# Patient Record
Sex: FEMALE | Race: WHITE | NOT HISPANIC OR LATINO | Employment: OTHER | URBAN - METROPOLITAN AREA
[De-identification: names, ages, dates, MRNs, and addresses within clinical notes are randomized per-mention and may not be internally consistent; named-entity substitution may affect disease eponyms.]

---

## 2017-01-09 ENCOUNTER — OFFICE VISIT (OUTPATIENT)
Dept: DERMATOLOGY | Age: 72
End: 2017-01-09

## 2017-01-09 DIAGNOSIS — D23.9 INTRADERMAL NEVUS: Primary | ICD-10-CM

## 2017-01-09 PROCEDURE — 99212 OFFICE O/P EST SF 10 MIN: CPT | Performed by: DERMATOLOGY

## 2017-03-21 ENCOUNTER — LAB SERVICES (OUTPATIENT)
Dept: LAB | Age: 72
End: 2017-03-21

## 2017-03-21 DIAGNOSIS — E89.0 POSTOPERATIVE HYPOTHYROIDISM: ICD-10-CM

## 2017-03-21 DIAGNOSIS — E55.9 VITAMIN D DEFICIENCY: ICD-10-CM

## 2017-03-21 DIAGNOSIS — R73.01 IFG (IMPAIRED FASTING GLUCOSE): ICD-10-CM

## 2017-03-21 PROCEDURE — 36415 COLL VENOUS BLD VENIPUNCTURE: CPT | Performed by: FAMILY MEDICINE

## 2017-03-22 LAB
25(OH)D3+25(OH)D2 SERPL-MCNC: 37.8 NG/ML (ref 30–100)
ANION GAP SERPL CALC-SCNC: 20 MMOL/L (ref 10–20)
BUN SERPL-MCNC: 18 MG/DL (ref 6–20)
BUN/CREAT SERPL: 25 (ref 7–25)
CALCIUM SERPL-MCNC: 9.2 MG/DL (ref 8.4–10.2)
CHLORIDE SERPL-SCNC: 104 MMOL/L (ref 98–107)
CO2 SERPL-SCNC: 23 MMOL/L (ref 21–32)
CREAT SERPL-MCNC: 0.71 MG/DL (ref 0.51–0.95)
FASTING STATUS PATIENT QL REPORTED: 12 HRS
GLUCOSE SERPL-MCNC: 130 MG/DL (ref 65–99)
HBA1C MFR BLD: 5.6 % (ref 4.5–5.6)
POTASSIUM SERPL-SCNC: 4.1 MMOL/L (ref 3.4–5.1)
SODIUM SERPL-SCNC: 143 MMOL/L (ref 135–145)
TSH SERPL-ACNC: 0.14 MCUNITS/ML (ref 0.35–5)

## 2017-03-28 ENCOUNTER — OFFICE VISIT (OUTPATIENT)
Dept: FAMILY MEDICINE | Age: 72
End: 2017-03-28

## 2017-03-28 VITALS
HEART RATE: 84 BPM | WEIGHT: 165 LBS | SYSTOLIC BLOOD PRESSURE: 130 MMHG | BODY MASS INDEX: 29.23 KG/M2 | DIASTOLIC BLOOD PRESSURE: 84 MMHG | HEIGHT: 63 IN | RESPIRATION RATE: 12 BRPM

## 2017-03-28 DIAGNOSIS — K21.9 GASTROESOPHAGEAL REFLUX DISEASE, ESOPHAGITIS PRESENCE NOT SPECIFIED: ICD-10-CM

## 2017-03-28 DIAGNOSIS — M81.0 OSTEOPOROSIS: ICD-10-CM

## 2017-03-28 DIAGNOSIS — E89.0 POSTOPERATIVE HYPOTHYROIDISM: ICD-10-CM

## 2017-03-28 DIAGNOSIS — E78.5 DYSLIPIDEMIA: ICD-10-CM

## 2017-03-28 DIAGNOSIS — Z00.00 MEDICARE ANNUAL WELLNESS VISIT, SUBSEQUENT: Primary | ICD-10-CM

## 2017-03-28 DIAGNOSIS — D68.51 FACTOR V LEIDEN (CMD): ICD-10-CM

## 2017-03-28 DIAGNOSIS — R73.01 IFG (IMPAIRED FASTING GLUCOSE): ICD-10-CM

## 2017-03-28 DIAGNOSIS — Z12.31 ENCOUNTER FOR SCREENING MAMMOGRAM FOR BREAST CANCER: ICD-10-CM

## 2017-03-28 PROCEDURE — G0439 PPPS, SUBSEQ VISIT: HCPCS | Performed by: FAMILY MEDICINE

## 2017-03-28 RX ORDER — OMEPRAZOLE 20 MG/1
20 CAPSULE, DELAYED RELEASE ORAL DAILY
Qty: 90 CAPSULE | Refills: 3 | Status: SHIPPED | OUTPATIENT
Start: 2017-03-28 | End: 2018-08-30 | Stop reason: SDUPTHER

## 2017-03-28 RX ORDER — LEVOTHYROXINE SODIUM 0.1 MG/1
100 TABLET ORAL DAILY
Qty: 90 TABLET | Refills: 3 | Status: SHIPPED | OUTPATIENT
Start: 2017-03-28 | End: 2018-03-12 | Stop reason: SDUPTHER

## 2017-03-28 ASSESSMENT — PATIENT HEALTH QUESTIONNAIRE - PHQ9
1. LITTLE INTEREST OR PLEASURE IN DOING THINGS: NO
2. FEELING DOWN, DEPRESSED OR HOPELESS: NO

## 2017-04-04 ENCOUNTER — IMAGING SERVICES (OUTPATIENT)
Dept: MAMMOGRAPHY | Age: 72
End: 2017-04-04
Attending: FAMILY MEDICINE

## 2017-04-04 DIAGNOSIS — Z12.31 ENCOUNTER FOR SCREENING MAMMOGRAM FOR BREAST CANCER: ICD-10-CM

## 2017-04-04 PROCEDURE — G0202 SCR MAMMO BI INCL CAD: HCPCS | Performed by: RADIOLOGY

## 2017-04-04 PROCEDURE — 77063 BREAST TOMOSYNTHESIS BI: CPT | Performed by: RADIOLOGY

## 2017-04-04 PROCEDURE — 3342F MAMMO ASSESS BENGN DOCD: CPT | Performed by: RADIOLOGY

## 2017-06-09 ENCOUNTER — TELEPHONE (OUTPATIENT)
Dept: FAMILY MEDICINE | Age: 72
End: 2017-06-09

## 2017-06-09 DIAGNOSIS — Z01.84 IMMUNITY STATUS TESTING: Primary | ICD-10-CM

## 2018-01-31 ENCOUNTER — APPOINTMENT (OUTPATIENT)
Age: 73
Setting detail: DERMATOLOGY
End: 2018-01-31

## 2018-01-31 DIAGNOSIS — L82.0 INFLAMED SEBORRHEIC KERATOSIS: ICD-10-CM

## 2018-01-31 DIAGNOSIS — L23.9 ALLERGIC CONTACT DERMATITIS, UNSPECIFIED CAUSE: ICD-10-CM

## 2018-01-31 DIAGNOSIS — Z85.828 PERSONAL HISTORY OF OTHER MALIGNANT NEOPLASM OF SKIN: ICD-10-CM

## 2018-01-31 DIAGNOSIS — D18.0 HEMANGIOMA: ICD-10-CM

## 2018-01-31 PROBLEM — D48.5 NEOPLASM OF UNCERTAIN BEHAVIOR OF SKIN: Status: ACTIVE | Noted: 2018-01-31

## 2018-01-31 PROBLEM — L30.9 DERMATITIS, UNSPECIFIED: Status: ACTIVE | Noted: 2018-01-31

## 2018-01-31 PROBLEM — D18.01 HEMANGIOMA OF SKIN AND SUBCUTANEOUS TISSUE: Status: ACTIVE | Noted: 2018-01-31

## 2018-01-31 PROCEDURE — OTHER REASSURANCE: OTHER

## 2018-01-31 PROCEDURE — OTHER BIOPSY BY SHAVE METHOD: OTHER

## 2018-01-31 PROCEDURE — OTHER COUNSELING: OTHER

## 2018-01-31 PROCEDURE — 11100: CPT

## 2018-01-31 PROCEDURE — 99214 OFFICE O/P EST MOD 30 MIN: CPT | Mod: 25

## 2018-01-31 PROCEDURE — OTHER PRESCRIPTION: OTHER

## 2018-01-31 PROCEDURE — 11101: CPT

## 2018-01-31 RX ORDER — TRIAMCINOLONE ACETONIDE 1 MG/G
CREAM TOPICAL BID PRN
Qty: 1 | Refills: 2 | Status: ERX | COMMUNITY
Start: 2018-01-31

## 2018-01-31 RX ORDER — HYDROXYZINE HYDROCHLORIDE 25 MG/1
TABLET, FILM COATED ORAL
Qty: 30 | Refills: 2 | Status: ERX

## 2018-01-31 ASSESSMENT — LOCATION ZONE DERM
LOCATION ZONE: ARM
LOCATION ZONE: FACE

## 2018-01-31 ASSESSMENT — LOCATION SIMPLE DESCRIPTION DERM
LOCATION SIMPLE: LEFT FOREARM
LOCATION SIMPLE: GLABELLA
LOCATION SIMPLE: LEFT POSTERIOR UPPER ARM
LOCATION SIMPLE: LEFT SHOULDER

## 2018-01-31 ASSESSMENT — LOCATION DETAILED DESCRIPTION DERM
LOCATION DETAILED: LEFT DISTAL RADIAL DORSAL FOREARM
LOCATION DETAILED: LEFT POSTERIOR SHOULDER
LOCATION DETAILED: GLABELLA
LOCATION DETAILED: LEFT PROXIMAL POSTERIOR UPPER ARM
LOCATION DETAILED: LEFT PROXIMAL DORSAL FOREARM

## 2018-01-31 NOTE — PROCEDURE: BIOPSY BY SHAVE METHOD
Anesthesia Volume In Cc (Will Not Render If 0): 0.5
Billing Type: Third-Party Bill
Notification Instructions: Patient will be notified of biopsy results. However, patient instructed to call the office if not contacted within 2 weeks.
Bill 62908 For Specimen Handling/Conveyance To Laboratory?: no
Post-Care Instructions: I reviewed with the patient in detail post-care instructions. Patient is to keep the biopsy site dry overnight, and then apply bacitracin twice daily until healed.
Cryotherapy Text: The wound bed was treated with cryotherapy after the biopsy was performed.
Biopsy Method: Dermablade
Curettage Text: The wound bed was treated with curettage after the biopsy was performed.
Additional Anesthesia Volume In Cc (Will Not Render If 0): 0
Electrodesiccation Text: The wound bed was treated with electrodesiccation after the biopsy was performed.
Biopsy Type: H and E
Dressing: bandage
Hemostasis: Aluminum Chloride
Type Of Destruction Used: Curettage
Anesthesia Type: 1% lidocaine with epinephrine and a 1:10 solution of 8.4% sodium bicarbonate
Silver Nitrate Text: The wound bed was treated with silver nitrate after the biopsy was performed.
Wound Care: Bacitracin
Electrodesiccation And Curettage Text: The wound bed was treated with electrodesiccation and curettage after the biopsy was performed.
Consent: Written consent was obtained and risks were reviewed including but not limited to scarring, infection, bleeding, scabbing, incomplete removal, nerve damage and allergy to anesthesia.
Detail Level: Detailed

## 2018-02-12 ENCOUNTER — APPOINTMENT (OUTPATIENT)
Age: 73
Setting detail: DERMATOLOGY
End: 2018-02-12

## 2018-02-12 DIAGNOSIS — Z48.02 ENCOUNTER FOR REMOVAL OF SUTURES: ICD-10-CM

## 2018-02-12 PROCEDURE — OTHER SUTURE REMOVAL (GLOBAL PERIOD): OTHER

## 2018-02-12 PROCEDURE — OTHER ADDITIONAL NOTES: OTHER

## 2018-02-12 PROCEDURE — 99024 POSTOP FOLLOW-UP VISIT: CPT

## 2018-02-12 ASSESSMENT — LOCATION ZONE DERM
LOCATION ZONE: FACE
LOCATION ZONE: ARM

## 2018-02-12 ASSESSMENT — LOCATION DETAILED DESCRIPTION DERM
LOCATION DETAILED: LEFT POSTERIOR SHOULDER
LOCATION DETAILED: LEFT DISTAL RADIAL DORSAL FOREARM
LOCATION DETAILED: LEFT PROXIMAL POSTERIOR UPPER ARM
LOCATION DETAILED: GLABELLA
LOCATION DETAILED: LEFT PROXIMAL DORSAL FOREARM

## 2018-02-12 ASSESSMENT — LOCATION SIMPLE DESCRIPTION DERM
LOCATION SIMPLE: GLABELLA
LOCATION SIMPLE: LEFT SHOULDER
LOCATION SIMPLE: LEFT FOREARM
LOCATION SIMPLE: LEFT POSTERIOR UPPER ARM

## 2018-02-12 NOTE — PROCEDURE: SUTURE REMOVAL (GLOBAL PERIOD)
Add 11969 Cpt? (Important Note: In 2017 The Use Of 57005 Is Being Tracked By Cms To Determine Future Global Period Reimbursement For Global Periods): yes
Detail Level: Detailed

## 2018-02-20 ENCOUNTER — APPOINTMENT (OUTPATIENT)
Age: 73
Setting detail: DERMATOLOGY
End: 2018-02-20

## 2018-02-20 DIAGNOSIS — Z48.02 ENCOUNTER FOR REMOVAL OF SUTURES: ICD-10-CM

## 2018-02-20 PROCEDURE — OTHER LIQUID NITROGEN: OTHER

## 2018-02-20 PROCEDURE — OTHER PATHOLOGY DISCUSSION: OTHER

## 2018-02-20 PROCEDURE — 99024 POSTOP FOLLOW-UP VISIT: CPT

## 2018-02-20 PROCEDURE — 17263 DSTRJ MAL LES T/A/L 2.1-3.0: CPT | Mod: 59

## 2018-02-20 PROCEDURE — 17282 DSTR MAL LS F/E/E/N/L/M1.1-2: CPT | Mod: 59

## 2018-02-20 PROCEDURE — 17000 DESTRUCT PREMALG LESION: CPT

## 2018-02-20 PROCEDURE — OTHER COUNSELING: OTHER

## 2018-02-20 PROCEDURE — OTHER SUTURE REMOVAL (GLOBAL PERIOD): OTHER

## 2018-02-20 PROCEDURE — OTHER CRYOSURGICAL DESTRUCTION: OTHER

## 2018-02-20 ASSESSMENT — LOCATION ZONE DERM
LOCATION ZONE: ARM
LOCATION ZONE: FACE

## 2018-02-20 ASSESSMENT — LOCATION DETAILED DESCRIPTION DERM
LOCATION DETAILED: LEFT PROXIMAL DORSAL FOREARM
LOCATION DETAILED: LEFT DISTAL RADIAL DORSAL FOREARM
LOCATION DETAILED: LEFT PROXIMAL POSTERIOR UPPER ARM
LOCATION DETAILED: LEFT DISTAL DORSAL FOREARM
LOCATION DETAILED: LEFT POSTERIOR SHOULDER
LOCATION DETAILED: GLABELLA

## 2018-02-20 ASSESSMENT — LOCATION SIMPLE DESCRIPTION DERM
LOCATION SIMPLE: GLABELLA
LOCATION SIMPLE: LEFT FOREARM
LOCATION SIMPLE: LEFT SHOULDER
LOCATION SIMPLE: LEFT POSTERIOR UPPER ARM

## 2018-02-20 NOTE — PROCEDURE: CRYOSURGICAL DESTRUCTION
Total Time In Minutes: 3 minutes
Add Ability To Document Additional Intralesional Injection: No
Detail Level: Detailed
Post-Care Instructions: I reviewed with the patient in detail post-care instructions. Patient is to keep the area dry for 48 hours, and not to engage in any heavy lifting, exercise, or swimming for the next 14 days. Should the patient develop any fevers, chills, bleeding, severe pain patient will contact the office immediately.
Total Volume (Ccs): 1
Medication Injected: 5-Fluorouracil
Size Of Lesion In Cm: 2.6
Size Of Lesion In Cm: 1.3
Pre-Procedure: The surgical site was antiseptically prepared.
Additional Information: (Optional): The wound was cleaned, and a dressing was applied.  The patient received detailed post-op instructions.
Number Of Freeze-Thaw Cycles: 3 freeze-thaw cycles
Anesthesia Volume In Cc: 0
Bill As A Line Item Or As Units: Line Item
Anesthesia Type: 1% lidocaine with epinephrine
Consent was obtained from the patient. The risks and benefits to therapy were discussed in detail. Specifically, the risks of infection, scarring, bleeding, prolonged wound healing, incomplete removal, allergy to anesthesia, nerve injury and recurrence were addressed. Alternatives to liquid nitrogen, such as ED&C, surgical removal, XRT were also discussed.  Prior to the procedure, the treatment site was clearly identified and confirmed by the patient. All components of Universal Protocol/PAUSE Rule completed.

## 2018-02-20 NOTE — PROCEDURE: SUTURE REMOVAL (GLOBAL PERIOD)
Detail Level: Detailed
Add 06157 Cpt? (Important Note: In 2017 The Use Of 39476 Is Being Tracked By Cms To Determine Future Global Period Reimbursement For Global Periods): yes

## 2018-02-21 ENCOUNTER — APPOINTMENT (OUTPATIENT)
Age: 73
Setting detail: DERMATOLOGY
End: 2018-02-21

## 2018-03-12 ENCOUNTER — TELEPHONE (OUTPATIENT)
Dept: FAMILY MEDICINE | Age: 73
End: 2018-03-12

## 2018-03-12 DIAGNOSIS — E89.0 POSTOPERATIVE HYPOTHYROIDISM: ICD-10-CM

## 2018-03-12 RX ORDER — LEVOTHYROXINE SODIUM 0.1 MG/1
100 TABLET ORAL DAILY
Qty: 90 TABLET | Refills: 0 | Status: SHIPPED | OUTPATIENT
Start: 2018-03-12 | End: 2018-04-11 | Stop reason: SDUPTHER

## 2018-03-19 ENCOUNTER — APPOINTMENT (OUTPATIENT)
Age: 73
Setting detail: DERMATOLOGY
End: 2018-03-19

## 2018-03-19 ENCOUNTER — RX ONLY (RX ONLY)
Age: 73
End: 2018-03-19

## 2018-03-19 DIAGNOSIS — T81.89 OTHER COMPLICATIONS OF PROCEDURES, NOT ELSEWHERE CLASSIFIED: ICD-10-CM

## 2018-03-19 PROBLEM — T81.89XA OTHER COMPLICATIONS OF PROCEDURES, NOT ELSEWHERE CLASSIFIED, INITIAL ENCOUNTER: Status: ACTIVE | Noted: 2018-03-19

## 2018-03-19 PROCEDURE — OTHER PRESCRIPTION: OTHER

## 2018-03-19 PROCEDURE — OTHER COUNSELING: OTHER

## 2018-03-19 PROCEDURE — 99024 POSTOP FOLLOW-UP VISIT: CPT

## 2018-03-19 PROCEDURE — OTHER MIPS QUALITY: OTHER

## 2018-03-19 RX ORDER — MUPIROCIN 20 MG/G
OINTMENT TOPICAL BID
Qty: 1 | Refills: 1 | Status: ERX | COMMUNITY
Start: 2018-03-19

## 2018-03-19 RX ORDER — CEPHALEXIN 500 MG/1
1 CAPSULE ORAL BID
Qty: 14 | Refills: 0 | Status: ERX

## 2018-03-19 ASSESSMENT — LOCATION ZONE DERM: LOCATION ZONE: ARM

## 2018-03-19 ASSESSMENT — LOCATION SIMPLE DESCRIPTION DERM: LOCATION SIMPLE: LEFT POSTERIOR UPPER ARM

## 2018-03-19 ASSESSMENT — LOCATION DETAILED DESCRIPTION DERM: LOCATION DETAILED: LEFT PROXIMAL POSTERIOR UPPER ARM

## 2018-03-20 ENCOUNTER — TELEPHONE (OUTPATIENT)
Dept: FAMILY MEDICINE | Age: 73
End: 2018-03-20

## 2018-03-20 RX ORDER — AZITHROMYCIN 250 MG/1
TABLET, FILM COATED ORAL
Qty: 6 TABLET | Refills: 0 | Status: SHIPPED | OUTPATIENT
Start: 2018-03-20 | End: 2018-03-25

## 2018-03-27 ENCOUNTER — APPOINTMENT (OUTPATIENT)
Age: 73
Setting detail: DERMATOLOGY
End: 2018-03-27

## 2018-03-27 DIAGNOSIS — Z85.828 PERSONAL HISTORY OF OTHER MALIGNANT NEOPLASM OF SKIN: ICD-10-CM

## 2018-03-27 DIAGNOSIS — T81.89 OTHER COMPLICATIONS OF PROCEDURES, NOT ELSEWHERE CLASSIFIED: ICD-10-CM

## 2018-03-27 PROBLEM — T81.89XA OTHER COMPLICATIONS OF PROCEDURES, NOT ELSEWHERE CLASSIFIED, INITIAL ENCOUNTER: Status: ACTIVE | Noted: 2018-03-27

## 2018-03-27 PROCEDURE — OTHER REASSURANCE: OTHER

## 2018-03-27 PROCEDURE — OTHER COUNSELING: OTHER

## 2018-03-27 PROCEDURE — 99024 POSTOP FOLLOW-UP VISIT: CPT

## 2018-03-27 PROCEDURE — OTHER ADDITIONAL NOTES: OTHER

## 2018-04-04 ENCOUNTER — LAB SERVICES (OUTPATIENT)
Dept: LAB | Age: 73
End: 2018-04-04

## 2018-04-04 DIAGNOSIS — K21.9 GASTROESOPHAGEAL REFLUX DISEASE, ESOPHAGITIS PRESENCE NOT SPECIFIED: ICD-10-CM

## 2018-04-04 DIAGNOSIS — R73.01 IFG (IMPAIRED FASTING GLUCOSE): ICD-10-CM

## 2018-04-04 DIAGNOSIS — M81.0 OSTEOPOROSIS: ICD-10-CM

## 2018-04-04 DIAGNOSIS — E78.5 DYSLIPIDEMIA: ICD-10-CM

## 2018-04-04 DIAGNOSIS — E89.0 POSTOPERATIVE HYPOTHYROIDISM: ICD-10-CM

## 2018-04-04 PROCEDURE — 36415 COLL VENOUS BLD VENIPUNCTURE: CPT | Performed by: FAMILY MEDICINE

## 2018-04-05 LAB
25(OH)D3+25(OH)D2 SERPL-MCNC: 31.2 NG/ML (ref 30–100)
ALBUMIN SERPL-MCNC: 4 G/DL (ref 3.6–5.1)
ALBUMIN/GLOB SERPL: 1.4 {RATIO} (ref 1–2.4)
ALP SERPL-CCNC: 98 UNITS/L (ref 45–117)
ALT SERPL-CCNC: 24 UNITS/L
ANION GAP SERPL CALC-SCNC: 12 MMOL/L (ref 10–20)
AST SERPL-CCNC: 17 UNITS/L
BASOPHILS # BLD AUTO: 0 K/MCL (ref 0–0.3)
BASOPHILS NFR BLD AUTO: 1 %
BILIRUB SERPL-MCNC: 0.6 MG/DL (ref 0.2–1)
BUN SERPL-MCNC: 16 MG/DL (ref 6–20)
BUN/CREAT SERPL: 23 (ref 7–25)
CALCIUM SERPL-MCNC: 8.9 MG/DL (ref 8.4–10.2)
CHLORIDE SERPL-SCNC: 110 MMOL/L (ref 98–107)
CHOLEST SERPL-MCNC: 185 MG/DL
CHOLEST/HDLC SERPL: 3.3 {RATIO}
CO2 SERPL-SCNC: 27 MMOL/L (ref 21–32)
CREAT SERPL-MCNC: 0.69 MG/DL (ref 0.51–0.95)
DIFFERENTIAL METHOD BLD: ABNORMAL
EOSINOPHIL # BLD AUTO: 0 K/MCL (ref 0.1–0.5)
EOSINOPHIL NFR SPEC: 1 %
ERYTHROCYTE [DISTWIDTH] IN BLOOD: 13.1 % (ref 11–15)
FASTING STATUS PATIENT QL REPORTED: 12 HRS
GLOBULIN SER-MCNC: 2.8 G/DL (ref 2–4)
GLUCOSE SERPL-MCNC: 122 MG/DL (ref 65–99)
HBA1C MFR BLD: 5.1 % (ref 4.5–5.6)
HCT VFR BLD CALC: 46.3 % (ref 36–46.5)
HDLC SERPL-MCNC: 56 MG/DL
HGB BLD-MCNC: 15.2 G/DL (ref 12–15.5)
IMM GRANULOCYTES # BLD AUTO: 0 K/MCL (ref 0–0.2)
IMM GRANULOCYTES NFR BLD: 0 %
LDLC SERPL-MCNC: 96 MG/DL
LENGTH OF FAST TIME PATIENT: 12 HRS
LYMPHOCYTES # BLD MANUAL: 1.5 K/MCL (ref 1–4)
LYMPHOCYTES NFR BLD MANUAL: 32 %
MCH RBC QN AUTO: 31.1 PG (ref 26–34)
MCHC RBC AUTO-ENTMCNC: 32.8 G/DL (ref 32–36.5)
MCV RBC AUTO: 94.7 FL (ref 78–100)
MONOCYTES # BLD MANUAL: 0.3 K/MCL (ref 0.3–0.9)
MONOCYTES NFR BLD MANUAL: 6 %
NEUTROPHILS # BLD: 2.8 K/MCL (ref 1.8–7.7)
NEUTROPHILS NFR BLD AUTO: 60 %
NONHDLC SERPL-MCNC: 129 MG/DL
NRBC BLD MANUAL-RTO: 0 %
PLATELET # BLD: 214 K/MCL (ref 140–450)
POTASSIUM SERPL-SCNC: 4.2 MMOL/L (ref 3.4–5.1)
PROT SERPL-MCNC: 6.8 G/DL (ref 6.4–8.2)
RBC # BLD: 4.89 MIL/MCL (ref 4–5.2)
SODIUM SERPL-SCNC: 145 MMOL/L (ref 135–145)
TRIGL SERPL-MCNC: 166 MG/DL
TSH SERPL-ACNC: 0.05 MCUNITS/ML (ref 0.35–5)
WBC # BLD: 4.7 K/MCL (ref 4.2–11)

## 2018-04-11 ENCOUNTER — OFFICE VISIT (OUTPATIENT)
Dept: FAMILY MEDICINE | Age: 73
End: 2018-04-11

## 2018-04-11 ENCOUNTER — APPOINTMENT (OUTPATIENT)
Age: 73
Setting detail: DERMATOLOGY
End: 2018-04-11

## 2018-04-11 VITALS
BODY MASS INDEX: 28.17 KG/M2 | HEART RATE: 80 BPM | SYSTOLIC BLOOD PRESSURE: 136 MMHG | DIASTOLIC BLOOD PRESSURE: 88 MMHG | WEIGHT: 159 LBS | RESPIRATION RATE: 12 BRPM | HEIGHT: 63 IN

## 2018-04-11 DIAGNOSIS — D68.51 FACTOR V LEIDEN (CMD): ICD-10-CM

## 2018-04-11 DIAGNOSIS — Z00.00 MEDICARE ANNUAL WELLNESS VISIT, SUBSEQUENT: Primary | ICD-10-CM

## 2018-04-11 DIAGNOSIS — M81.0 OSTEOPOROSIS, UNSPECIFIED OSTEOPOROSIS TYPE, UNSPECIFIED PATHOLOGICAL FRACTURE PRESENCE: ICD-10-CM

## 2018-04-11 DIAGNOSIS — R73.01 IFG (IMPAIRED FASTING GLUCOSE): ICD-10-CM

## 2018-04-11 DIAGNOSIS — T81.89 OTHER COMPLICATIONS OF PROCEDURES, NOT ELSEWHERE CLASSIFIED: ICD-10-CM

## 2018-04-11 DIAGNOSIS — E78.5 DYSLIPIDEMIA: ICD-10-CM

## 2018-04-11 DIAGNOSIS — E89.0 POSTOPERATIVE HYPOTHYROIDISM: ICD-10-CM

## 2018-04-11 DIAGNOSIS — Z00.00 PREVENTATIVE HEALTH CARE: ICD-10-CM

## 2018-04-11 DIAGNOSIS — R25.1 TREMOR: ICD-10-CM

## 2018-04-11 DIAGNOSIS — E55.9 VITAMIN D DEFICIENCY: ICD-10-CM

## 2018-04-11 DIAGNOSIS — Z86.73 HISTORY OF CVA (CEREBROVASCULAR ACCIDENT): ICD-10-CM

## 2018-04-11 DIAGNOSIS — K21.9 GASTROESOPHAGEAL REFLUX DISEASE, ESOPHAGITIS PRESENCE NOT SPECIFIED: ICD-10-CM

## 2018-04-11 PROBLEM — T81.89XA OTHER COMPLICATIONS OF PROCEDURES, NOT ELSEWHERE CLASSIFIED, INITIAL ENCOUNTER: Status: ACTIVE | Noted: 2018-04-11

## 2018-04-11 PROCEDURE — OTHER ADDITIONAL NOTES: OTHER

## 2018-04-11 PROCEDURE — G0439 PPPS, SUBSEQ VISIT: HCPCS | Performed by: FAMILY MEDICINE

## 2018-04-11 PROCEDURE — 99212 OFFICE O/P EST SF 10 MIN: CPT

## 2018-04-11 PROCEDURE — OTHER COUNSELING: OTHER

## 2018-04-11 PROCEDURE — 99214 OFFICE O/P EST MOD 30 MIN: CPT | Performed by: FAMILY MEDICINE

## 2018-04-11 RX ORDER — NAPROXEN SODIUM 220 MG
440 TABLET ORAL DAILY
COMMUNITY
End: 2019-04-12 | Stop reason: ALTCHOICE

## 2018-04-11 RX ORDER — LEVOTHYROXINE SODIUM 88 UG/1
88 TABLET ORAL DAILY
Qty: 90 TABLET | Refills: 3 | Status: SHIPPED | OUTPATIENT
Start: 2018-04-11 | End: 2018-08-30 | Stop reason: SDUPTHER

## 2018-04-11 ASSESSMENT — PATIENT HEALTH QUESTIONNAIRE - PHQ9
CLINICAL INTERPRETATION OF PHQ2 SCORE: 0
SUM OF ALL RESPONSES TO PHQ9 QUESTIONS 1 AND 2: 0

## 2018-04-11 ASSESSMENT — LOCATION ZONE DERM: LOCATION ZONE: ARM

## 2018-04-11 ASSESSMENT — LOCATION SIMPLE DESCRIPTION DERM: LOCATION SIMPLE: LEFT POSTERIOR UPPER ARM

## 2018-04-11 ASSESSMENT — LOCATION DETAILED DESCRIPTION DERM: LOCATION DETAILED: LEFT PROXIMAL POSTERIOR UPPER ARM

## 2018-04-11 NOTE — PROCEDURE: ADDITIONAL NOTES
Additional Notes: Lesion shows much improvement from last visit.  Lesion is healing well.  Redness is noted around the lesion, but no signs of infection noted.  Area will be cleansed and dressed.

## 2018-06-27 ENCOUNTER — APPOINTMENT (OUTPATIENT)
Age: 73
Setting detail: DERMATOLOGY
End: 2018-06-27

## 2018-06-27 DIAGNOSIS — L57.0 ACTINIC KERATOSIS: ICD-10-CM

## 2018-06-27 DIAGNOSIS — Z85.828 PERSONAL HISTORY OF OTHER MALIGNANT NEOPLASM OF SKIN: ICD-10-CM

## 2018-06-27 PROBLEM — D48.5 NEOPLASM OF UNCERTAIN BEHAVIOR OF SKIN: Status: ACTIVE | Noted: 2018-06-27

## 2018-06-27 PROCEDURE — 99213 OFFICE O/P EST LOW 20 MIN: CPT | Mod: 25

## 2018-06-27 PROCEDURE — OTHER REASSURANCE: OTHER

## 2018-06-27 PROCEDURE — OTHER BIOPSY BY SHAVE METHOD: OTHER

## 2018-06-27 PROCEDURE — 11100: CPT

## 2018-06-27 PROCEDURE — 11101: CPT

## 2018-06-27 ASSESSMENT — LOCATION SIMPLE DESCRIPTION DERM
LOCATION SIMPLE: LEFT FOREARM
LOCATION SIMPLE: RIGHT FOREARM

## 2018-06-27 ASSESSMENT — LOCATION DETAILED DESCRIPTION DERM
LOCATION DETAILED: RIGHT DISTAL DORSAL FOREARM
LOCATION DETAILED: LEFT DISTAL DORSAL FOREARM
LOCATION DETAILED: RIGHT PROXIMAL DORSAL FOREARM

## 2018-06-27 ASSESSMENT — LOCATION ZONE DERM: LOCATION ZONE: ARM

## 2018-06-27 NOTE — PROCEDURE: BIOPSY BY SHAVE METHOD
Silver Nitrate Text: The wound bed was treated with silver nitrate after the biopsy was performed.
Wound Care: Bacitracin
Post-Care Instructions: I reviewed with the patient in detail post-care instructions. Patient is to keep the biopsy site dry overnight, and then apply bacitracin twice daily until healed.
Notification Instructions: Patient will be notified of biopsy results. However, patient instructed to call the office if not contacted within 2 weeks.
Was A Bandage Applied: Yes
X Size Of Lesion In Cm: 0
Bill For Surgical Tray: no
Consent: Written consent was obtained and risks were reviewed including but not limited to scarring, infection, bleeding, scabbing, incomplete removal, nerve damage and allergy to anesthesia.
Biopsy Type: H and E
Electrodesiccation And Curettage Text: The wound bed was treated with electrodesiccation and curettage after the biopsy was performed.
Billing Type: Third-Party Bill
Anesthesia Type: 1% lidocaine with epinephrine and a 1:10 solution of 8.4% sodium bicarbonate
Biopsy Method: Dermablade
Dressing: bandage
Electrodesiccation Text: The wound bed was treated with electrodesiccation after the biopsy was performed.
Detail Level: Detailed
Hemostasis: Aluminum Chloride
Type Of Destruction Used: Curettage
Cryotherapy Text: The wound bed was treated with cryotherapy after the biopsy was performed.
Anesthesia Volume In Cc (Will Not Render If 0): 0.5
Depth Of Biopsy: dermis
Curettage Text: The wound bed was treated with curettage after the biopsy was performed.

## 2018-07-02 ENCOUNTER — LAB SERVICES (OUTPATIENT)
Dept: LAB | Age: 73
End: 2018-07-02

## 2018-07-02 ENCOUNTER — TELEPHONE (OUTPATIENT)
Dept: FAMILY MEDICINE | Age: 73
End: 2018-07-02

## 2018-07-02 DIAGNOSIS — R39.15 URINARY URGENCY: ICD-10-CM

## 2018-07-02 DIAGNOSIS — R30.0 DYSURIA: ICD-10-CM

## 2018-07-02 DIAGNOSIS — R30.0 DYSURIA: Primary | ICD-10-CM

## 2018-07-02 DIAGNOSIS — R35.0 FREQUENCY OF URINATION: ICD-10-CM

## 2018-07-02 LAB
APPEARANCE UR: NORMAL
BILIRUB UR QL: NORMAL
COLOR UR: YELLOW
GLUCOSE UR-MCNC: NORMAL MG/DL
HGB UR QL: NORMAL
KETONES UR-MCNC: NORMAL MG/DL
LEUKOCYTE ESTERASE UR QL STRIP: NORMAL
NITRITE UR QL: NORMAL
PH UR: 5.5 [PH]
PROT UR QL: NORMAL
SP GR UR: <1.005
SPECIMEN SOURCE: NORMAL
UROBILINOGEN UR QL: 0.2

## 2018-07-02 PROCEDURE — 81002 URINALYSIS NONAUTO W/O SCOPE: CPT | Performed by: FAMILY MEDICINE

## 2018-07-02 RX ORDER — CIPROFLOXACIN 500 MG/1
500 TABLET, FILM COATED ORAL EVERY 12 HOURS
Qty: 14 TABLET | Refills: 0 | Status: SHIPPED | OUTPATIENT
Start: 2018-07-02 | End: 2018-07-09

## 2018-07-03 LAB
APPEARANCE UR: ABNORMAL
BACTERIA #/AREA URNS HPF: ABNORMAL /HPF
BILIRUB UR QL STRIP: NEGATIVE
COLOR UR: YELLOW
GLUCOSE UR STRIP-MCNC: NEGATIVE MG/DL
HGB UR QL STRIP: ABNORMAL
HYALINE CASTS #/AREA URNS LPF: ABNORMAL /LPF (ref 0–5)
KETONES UR STRIP-MCNC: NEGATIVE MG/DL
LEUKOCYTE ESTERASE UR QL STRIP: ABNORMAL
NITRITE UR QL STRIP: NEGATIVE
PH UR STRIP: 6 UNITS (ref 5–7)
PROT UR STRIP-MCNC: NEGATIVE MG/DL
RBC #/AREA URNS HPF: ABNORMAL /HPF (ref 0–3)
SP GR UR STRIP: 1.01 (ref 1–1.03)
SPECIMEN SOURCE: ABNORMAL
SQUAMOUS #/AREA URNS HPF: ABNORMAL /HPF (ref 0–5)
UROBILINOGEN UR STRIP-MCNC: 0.2 MG/DL (ref 0–1)
WBC #/AREA URNS HPF: >100 /HPF (ref 0–5)

## 2018-07-04 LAB
BACTERIA UR CULT: ABNORMAL
ORGANISM: ABNORMAL
REPORT STATUS (RPT): ABNORMAL
SPECIMEN SOURCE: ABNORMAL

## 2018-07-06 ENCOUNTER — RX ONLY (RX ONLY)
Age: 73
End: 2018-07-06

## 2018-07-06 ENCOUNTER — APPOINTMENT (OUTPATIENT)
Age: 73
Setting detail: DERMATOLOGY
End: 2018-07-06

## 2018-07-06 DIAGNOSIS — T81.89 OTHER COMPLICATIONS OF PROCEDURES, NOT ELSEWHERE CLASSIFIED: ICD-10-CM

## 2018-07-06 PROBLEM — T81.89XA OTHER COMPLICATIONS OF PROCEDURES, NOT ELSEWHERE CLASSIFIED, INITIAL ENCOUNTER: Status: ACTIVE | Noted: 2018-07-06

## 2018-07-06 PROCEDURE — OTHER PRESCRIPTION: OTHER

## 2018-07-06 PROCEDURE — 99024 POSTOP FOLLOW-UP VISIT: CPT

## 2018-07-06 PROCEDURE — OTHER COUNSELING: OTHER

## 2018-07-06 RX ORDER — CEPHALEXIN 500 MG/1
CAPSULE ORAL BID
Qty: 28 | Refills: 0 | Status: ERX

## 2018-07-06 RX ORDER — MUPIROCIN 20 MG/G
OINTMENT TOPICAL BID
Qty: 1 | Refills: 1 | Status: ERX

## 2018-07-12 ENCOUNTER — APPOINTMENT (OUTPATIENT)
Age: 73
Setting detail: DERMATOLOGY
End: 2018-07-12

## 2018-07-12 DIAGNOSIS — T81.89 OTHER COMPLICATIONS OF PROCEDURES, NOT ELSEWHERE CLASSIFIED: ICD-10-CM

## 2018-07-12 PROBLEM — T81.89XA OTHER COMPLICATIONS OF PROCEDURES, NOT ELSEWHERE CLASSIFIED, INITIAL ENCOUNTER: Status: ACTIVE | Noted: 2018-07-12

## 2018-07-12 PROCEDURE — OTHER ADDITIONAL NOTES: OTHER

## 2018-07-12 PROCEDURE — OTHER COUNSELING: OTHER

## 2018-07-12 PROCEDURE — 99024 POSTOP FOLLOW-UP VISIT: CPT

## 2018-07-12 NOTE — PROCEDURE: ADDITIONAL NOTES
Additional Notes: Pts wound appears to be healing well and is continuing to get better with antibiotics.

## 2018-07-26 ENCOUNTER — APPOINTMENT (OUTPATIENT)
Age: 73
Setting detail: DERMATOLOGY
End: 2018-07-26

## 2018-07-26 DIAGNOSIS — L57.0 ACTINIC KERATOSIS: ICD-10-CM

## 2018-07-26 DIAGNOSIS — T81.89 OTHER COMPLICATIONS OF PROCEDURES, NOT ELSEWHERE CLASSIFIED: ICD-10-CM

## 2018-07-26 PROBLEM — T81.89XA OTHER COMPLICATIONS OF PROCEDURES, NOT ELSEWHERE CLASSIFIED, INITIAL ENCOUNTER: Status: ACTIVE | Noted: 2018-07-26

## 2018-07-26 PROCEDURE — OTHER COUNSELING: OTHER

## 2018-07-26 PROCEDURE — OTHER LIQUID NITROGEN: OTHER

## 2018-07-26 PROCEDURE — OTHER ADDITIONAL NOTES: OTHER

## 2018-07-26 PROCEDURE — 99212 OFFICE O/P EST SF 10 MIN: CPT | Mod: 25

## 2018-07-26 PROCEDURE — 17000 DESTRUCT PREMALG LESION: CPT

## 2018-07-26 ASSESSMENT — LOCATION ZONE DERM: LOCATION ZONE: ARM

## 2018-07-26 ASSESSMENT — LOCATION DETAILED DESCRIPTION DERM
LOCATION DETAILED: RIGHT PROXIMAL DORSAL FOREARM
LOCATION DETAILED: RIGHT DISTAL DORSAL FOREARM

## 2018-07-26 ASSESSMENT — LOCATION SIMPLE DESCRIPTION DERM: LOCATION SIMPLE: RIGHT FOREARM

## 2018-07-26 NOTE — PROCEDURE: ADDITIONAL NOTES
Additional Notes: Pts wound appears to be healing well and is continuing to get better with antibiotics.\\nWill plan on Cryotherapy at f/u visit.

## 2018-08-16 ENCOUNTER — APPOINTMENT (OUTPATIENT)
Age: 73
Setting detail: DERMATOLOGY
End: 2018-08-16

## 2018-08-16 DIAGNOSIS — L57.0 ACTINIC KERATOSIS: ICD-10-CM

## 2018-08-16 DIAGNOSIS — L82.1 OTHER SEBORRHEIC KERATOSIS: ICD-10-CM

## 2018-08-16 PROCEDURE — 17000 DESTRUCT PREMALG LESION: CPT

## 2018-08-16 PROCEDURE — OTHER COUNSELING: OTHER

## 2018-08-16 PROCEDURE — 99213 OFFICE O/P EST LOW 20 MIN: CPT | Mod: 25

## 2018-08-16 PROCEDURE — OTHER REASSURANCE: OTHER

## 2018-08-16 PROCEDURE — OTHER LIQUID NITROGEN: OTHER

## 2018-08-16 ASSESSMENT — LOCATION DETAILED DESCRIPTION DERM: LOCATION DETAILED: RIGHT DISTAL DORSAL FOREARM

## 2018-08-16 ASSESSMENT — LOCATION ZONE DERM: LOCATION ZONE: ARM

## 2018-08-16 ASSESSMENT — LOCATION SIMPLE DESCRIPTION DERM: LOCATION SIMPLE: RIGHT FOREARM

## 2018-08-30 ENCOUNTER — TELEPHONE (OUTPATIENT)
Dept: FAMILY MEDICINE | Age: 73
End: 2018-08-30

## 2018-08-30 DIAGNOSIS — K21.9 GASTROESOPHAGEAL REFLUX DISEASE, ESOPHAGITIS PRESENCE NOT SPECIFIED: ICD-10-CM

## 2018-08-30 DIAGNOSIS — E89.0 POSTOPERATIVE HYPOTHYROIDISM: ICD-10-CM

## 2018-08-30 RX ORDER — OMEPRAZOLE 20 MG/1
20 CAPSULE, DELAYED RELEASE ORAL DAILY
Qty: 90 CAPSULE | Refills: 2 | Status: SHIPPED | OUTPATIENT
Start: 2018-08-30 | End: 2019-04-16 | Stop reason: SDUPTHER

## 2018-08-30 RX ORDER — LEVOTHYROXINE SODIUM 88 UG/1
88 TABLET ORAL DAILY
Qty: 90 TABLET | Refills: 2 | Status: SHIPPED | OUTPATIENT
Start: 2018-08-30 | End: 2019-04-16 | Stop reason: SDUPTHER

## 2018-10-15 ENCOUNTER — LAB SERVICES (OUTPATIENT)
Dept: LAB | Age: 73
End: 2018-10-15

## 2018-10-15 DIAGNOSIS — E89.0 POSTOPERATIVE HYPOTHYROIDISM: ICD-10-CM

## 2018-10-15 PROCEDURE — 36415 COLL VENOUS BLD VENIPUNCTURE: CPT | Performed by: FAMILY MEDICINE

## 2018-10-16 LAB — TSH SERPL-ACNC: 0.26 MCUNITS/ML (ref 0.35–5)

## 2019-01-07 ENCOUNTER — TELEPHONE (OUTPATIENT)
Dept: FAMILY MEDICINE | Age: 74
End: 2019-01-07

## 2019-01-10 ENCOUNTER — TELEPHONE (OUTPATIENT)
Dept: FAMILY MEDICINE | Age: 74
End: 2019-01-10

## 2019-01-10 RX ORDER — NYSTATIN 100000 U/G
CREAM TOPICAL 2 TIMES DAILY
Qty: 60 G | Refills: 0 | Status: SHIPPED | OUTPATIENT
Start: 2019-01-10 | End: 2019-02-10

## 2019-01-10 RX ORDER — FLUTICASONE PROPIONATE 0.05 %
CREAM (GRAM) TOPICAL
Qty: 15 G | Refills: 0 | Status: SHIPPED | OUTPATIENT
Start: 2019-01-10 | End: 2021-03-12 | Stop reason: SDUPTHER

## 2019-04-08 ENCOUNTER — LAB SERVICES (OUTPATIENT)
Dept: LAB | Age: 74
End: 2019-04-08

## 2019-04-08 ENCOUNTER — TELEPHONE (OUTPATIENT)
Dept: FAMILY MEDICINE | Age: 74
End: 2019-04-08

## 2019-04-08 DIAGNOSIS — Z11.59 NEED FOR HEPATITIS C SCREENING TEST: Primary | ICD-10-CM

## 2019-04-08 DIAGNOSIS — R73.01 IFG (IMPAIRED FASTING GLUCOSE): ICD-10-CM

## 2019-04-08 DIAGNOSIS — E89.0 POSTOPERATIVE HYPOTHYROIDISM: ICD-10-CM

## 2019-04-08 DIAGNOSIS — Z11.59 NEED FOR HEPATITIS C SCREENING TEST: ICD-10-CM

## 2019-04-08 DIAGNOSIS — E55.9 VITAMIN D DEFICIENCY: ICD-10-CM

## 2019-04-08 DIAGNOSIS — E78.5 DYSLIPIDEMIA: ICD-10-CM

## 2019-04-08 PROCEDURE — 36415 COLL VENOUS BLD VENIPUNCTURE: CPT | Performed by: FAMILY MEDICINE

## 2019-04-09 LAB
25(OH)D3+25(OH)D2 SERPL-MCNC: 28.1 NG/ML (ref 30–100)
ALBUMIN SERPL-MCNC: 4.1 G/DL (ref 3.6–5.1)
ALBUMIN/GLOB SERPL: 1.5 {RATIO} (ref 1–2.4)
ALP SERPL-CCNC: 101 UNITS/L (ref 45–117)
ALT SERPL-CCNC: 26 UNITS/L
ANION GAP SERPL CALC-SCNC: 11 MMOL/L (ref 10–20)
AST SERPL-CCNC: 14 UNITS/L
BILIRUB SERPL-MCNC: 0.6 MG/DL (ref 0.2–1)
BUN SERPL-MCNC: 27 MG/DL (ref 6–20)
BUN/CREAT SERPL: 35 (ref 7–25)
CALCIUM SERPL-MCNC: 9.2 MG/DL (ref 8.4–10.2)
CHLORIDE SERPL-SCNC: 106 MMOL/L (ref 98–107)
CHOLEST SERPL-MCNC: 181 MG/DL
CHOLEST/HDLC SERPL: 3.4 {RATIO}
CO2 SERPL-SCNC: 28 MMOL/L (ref 21–32)
CREAT SERPL-MCNC: 0.78 MG/DL (ref 0.51–0.95)
FASTING STATUS PATIENT QL REPORTED: 12 HRS
GLOBULIN SER-MCNC: 2.8 G/DL (ref 2–4)
GLUCOSE SERPL-MCNC: 139 MG/DL (ref 65–99)
HBA1C MFR BLD: 5.5 % (ref 4.5–5.6)
HCV AB SER QL: NEGATIVE
HDLC SERPL-MCNC: 53 MG/DL
LDLC SERPL-MCNC: 98 MG/DL
LENGTH OF FAST TIME PATIENT: 12 HRS
NONHDLC SERPL-MCNC: 128 MG/DL
POTASSIUM SERPL-SCNC: 4.6 MMOL/L (ref 3.4–5.1)
PROT SERPL-MCNC: 6.9 G/DL (ref 6.4–8.2)
SODIUM SERPL-SCNC: 140 MMOL/L (ref 135–145)
TRIGL SERPL-MCNC: 149 MG/DL
TSH SERPL-ACNC: 0.56 MCUNITS/ML (ref 0.35–5)

## 2019-04-12 ENCOUNTER — OFFICE VISIT (OUTPATIENT)
Dept: FAMILY MEDICINE | Age: 74
End: 2019-04-12

## 2019-04-12 VITALS
RESPIRATION RATE: 16 BRPM | HEART RATE: 70 BPM | HEIGHT: 63 IN | WEIGHT: 159.2 LBS | DIASTOLIC BLOOD PRESSURE: 86 MMHG | BODY MASS INDEX: 28.21 KG/M2 | SYSTOLIC BLOOD PRESSURE: 161 MMHG

## 2019-04-12 DIAGNOSIS — Z00.00 MEDICARE ANNUAL WELLNESS VISIT, SUBSEQUENT: Primary | ICD-10-CM

## 2019-04-12 DIAGNOSIS — R73.01 IFG (IMPAIRED FASTING GLUCOSE): ICD-10-CM

## 2019-04-12 DIAGNOSIS — R03.0 BLOOD PRESSURE ELEVATED WITHOUT HISTORY OF HTN: ICD-10-CM

## 2019-04-12 DIAGNOSIS — E55.9 VITAMIN D DEFICIENCY: ICD-10-CM

## 2019-04-12 DIAGNOSIS — Z12.11 COLON CANCER SCREENING: ICD-10-CM

## 2019-04-12 DIAGNOSIS — M81.0 OSTEOPOROSIS, UNSPECIFIED OSTEOPOROSIS TYPE, UNSPECIFIED PATHOLOGICAL FRACTURE PRESENCE: ICD-10-CM

## 2019-04-12 DIAGNOSIS — E78.5 DYSLIPIDEMIA: ICD-10-CM

## 2019-04-12 DIAGNOSIS — D68.51 FACTOR V LEIDEN (CMD): ICD-10-CM

## 2019-04-12 DIAGNOSIS — E03.9 HYPOTHYROIDISM, UNSPECIFIED TYPE: ICD-10-CM

## 2019-04-12 PROCEDURE — G0403 EKG FOR INITIAL PREVENT EXAM: HCPCS | Performed by: FAMILY MEDICINE

## 2019-04-12 PROCEDURE — G0439 PPPS, SUBSEQ VISIT: HCPCS | Performed by: FAMILY MEDICINE

## 2019-04-12 PROCEDURE — 99214 OFFICE O/P EST MOD 30 MIN: CPT | Performed by: FAMILY MEDICINE

## 2019-04-12 RX ORDER — LOSARTAN POTASSIUM 25 MG/1
25 TABLET ORAL DAILY
Qty: 30 TABLET | Refills: 2 | Status: SHIPPED | OUTPATIENT
Start: 2019-04-12 | End: 2019-04-12 | Stop reason: SDUPTHER

## 2019-04-12 RX ORDER — MULTIVITAMIN,THER AND MINERALS
1 TABLET ORAL DAILY
COMMUNITY

## 2019-04-12 RX ORDER — LOSARTAN POTASSIUM 25 MG/1
25 TABLET ORAL DAILY
Qty: 30 TABLET | Refills: 2 | Status: SHIPPED | OUTPATIENT
Start: 2019-04-12 | End: 2019-08-27 | Stop reason: SDUPTHER

## 2019-04-12 ASSESSMENT — PATIENT HEALTH QUESTIONNAIRE - PHQ9
1. LITTLE INTEREST OR PLEASURE IN DOING THINGS: NOT AT ALL
SUM OF ALL RESPONSES TO PHQ9 QUESTIONS 1 AND 2: 0
2. FEELING DOWN, DEPRESSED OR HOPELESS: NOT AT ALL
SUM OF ALL RESPONSES TO PHQ9 QUESTIONS 1 AND 2: 0

## 2019-04-16 DIAGNOSIS — E89.0 POSTOPERATIVE HYPOTHYROIDISM: ICD-10-CM

## 2019-04-16 DIAGNOSIS — K21.9 GASTROESOPHAGEAL REFLUX DISEASE, ESOPHAGITIS PRESENCE NOT SPECIFIED: ICD-10-CM

## 2019-04-16 RX ORDER — OMEPRAZOLE 20 MG/1
20 CAPSULE, DELAYED RELEASE ORAL DAILY
Qty: 90 CAPSULE | Refills: 3 | Status: SHIPPED | OUTPATIENT
Start: 2019-04-16 | End: 2020-04-27

## 2019-04-16 RX ORDER — LEVOTHYROXINE SODIUM 88 UG/1
88 TABLET ORAL DAILY
Qty: 90 TABLET | Refills: 3 | Status: SHIPPED | OUTPATIENT
Start: 2019-04-16 | End: 2020-04-27

## 2019-04-26 ENCOUNTER — TELEPHONE (OUTPATIENT)
Dept: FAMILY MEDICINE | Age: 74
End: 2019-04-26

## 2019-04-26 ENCOUNTER — NURSE ONLY (OUTPATIENT)
Dept: FAMILY MEDICINE | Age: 74
End: 2019-04-26

## 2019-04-26 VITALS — SYSTOLIC BLOOD PRESSURE: 142 MMHG | DIASTOLIC BLOOD PRESSURE: 84 MMHG | HEART RATE: 60 BPM

## 2019-04-26 DIAGNOSIS — R03.0 BLOOD PRESSURE ELEVATED WITHOUT HISTORY OF HTN: Primary | ICD-10-CM

## 2019-04-30 ENCOUNTER — TELEPHONE (OUTPATIENT)
Dept: FAMILY MEDICINE | Age: 74
End: 2019-04-30

## 2019-04-30 ENCOUNTER — NURSE ONLY (OUTPATIENT)
Dept: FAMILY MEDICINE | Age: 74
End: 2019-04-30

## 2019-04-30 VITALS — DIASTOLIC BLOOD PRESSURE: 74 MMHG | SYSTOLIC BLOOD PRESSURE: 128 MMHG

## 2019-04-30 DIAGNOSIS — R03.0 BLOOD PRESSURE ELEVATED WITHOUT HISTORY OF HTN: Primary | ICD-10-CM

## 2019-05-02 ENCOUNTER — ADVANCED DIRECTIVES (OUTPATIENT)
Dept: HEALTH INFORMATION MANAGEMENT | Age: 74
End: 2019-05-02

## 2019-05-29 LAB — NONINV COLON CA DNA+OCC BLD SCRN STL QL: POSITIVE

## 2019-06-10 ENCOUNTER — TELEPHONE (OUTPATIENT)
Dept: FAMILY MEDICINE | Age: 74
End: 2019-06-10

## 2019-06-10 DIAGNOSIS — Z12.11 ENCOUNTER FOR COLORECTAL CANCER SCREENING: Primary | ICD-10-CM

## 2019-06-10 DIAGNOSIS — R19.5 POSITIVE COLORECTAL CANCER SCREENING USING COLOGUARD TEST: ICD-10-CM

## 2019-06-10 DIAGNOSIS — Z12.12 ENCOUNTER FOR COLORECTAL CANCER SCREENING: Primary | ICD-10-CM

## 2019-06-11 ENCOUNTER — TELEPHONE (OUTPATIENT)
Dept: SURGERY | Age: 74
End: 2019-06-11

## 2019-06-12 ENCOUNTER — TELEPHONE (OUTPATIENT)
Dept: FAMILY MEDICINE | Age: 74
End: 2019-06-12

## 2019-06-12 DIAGNOSIS — R19.5 POSITIVE COLORECTAL CANCER SCREENING USING DNA-BASED STOOL TEST: Primary | ICD-10-CM

## 2019-07-10 ENCOUNTER — TELEPHONE (OUTPATIENT)
Dept: SURGERY | Age: 74
End: 2019-07-10

## 2019-07-12 ENCOUNTER — NURSE ONLY (OUTPATIENT)
Dept: FAMILY MEDICINE | Age: 74
End: 2019-07-12

## 2019-07-12 VITALS — DIASTOLIC BLOOD PRESSURE: 62 MMHG | SYSTOLIC BLOOD PRESSURE: 138 MMHG | HEART RATE: 88 BPM

## 2019-07-12 DIAGNOSIS — R03.0 BLOOD PRESSURE ELEVATED WITHOUT HISTORY OF HTN: Primary | ICD-10-CM

## 2019-07-16 ENCOUNTER — HOSPITAL ENCOUNTER (OUTPATIENT)
Age: 74
Discharge: HOME | End: 2019-07-16
Attending: SURGERY | Admitting: SURGERY

## 2019-07-16 ENCOUNTER — ANESTHESIA EVENT (OUTPATIENT)
Dept: SURGERY | Age: 74
End: 2019-07-16

## 2019-07-16 ENCOUNTER — ANESTHESIA (OUTPATIENT)
Dept: SURGERY | Age: 74
End: 2019-07-16

## 2019-07-16 VITALS
HEART RATE: 68 BPM | OXYGEN SATURATION: 96 % | WEIGHT: 157.85 LBS | RESPIRATION RATE: 19 BRPM | BODY MASS INDEX: 27.97 KG/M2 | DIASTOLIC BLOOD PRESSURE: 69 MMHG | HEIGHT: 63 IN | TEMPERATURE: 96.6 F | SYSTOLIC BLOOD PRESSURE: 140 MMHG

## 2019-07-16 DIAGNOSIS — Z12.11 COLON CANCER SCREENING: ICD-10-CM

## 2019-07-16 PROCEDURE — 10002807 HB RX 258: Performed by: SURGERY

## 2019-07-16 PROCEDURE — 10003428 HB COLONOSCOPY: Performed by: SURGERY

## 2019-07-16 PROCEDURE — 10001568 HB ANESTH MAC IV ADD'L 1/2 HR: Performed by: SURGERY

## 2019-07-16 PROCEDURE — 10002362 HB ANESTH MAC IV 1ST 1/2 HR: Performed by: SURGERY

## 2019-07-16 PROCEDURE — 88305 TISSUE EXAM BY PATHOLOGIST: CPT

## 2019-07-16 PROCEDURE — 45385 COLONOSCOPY W/LESION REMOVAL: CPT | Performed by: NURSE ANESTHETIST, CERTIFIED REGISTERED

## 2019-07-16 PROCEDURE — 10002801 HB RX 250 W/O HCPCS: Performed by: NURSE ANESTHETIST, CERTIFIED REGISTERED

## 2019-07-16 PROCEDURE — 10002800 HB RX 250 W HCPCS: Performed by: NURSE ANESTHETIST, CERTIFIED REGISTERED

## 2019-07-16 PROCEDURE — 45385 COLONOSCOPY W/LESION REMOVAL: CPT | Performed by: SURGERY

## 2019-07-16 RX ORDER — PROPOFOL 10 MG/ML
INJECTION, EMULSION INTRAVENOUS PRN
Status: DISCONTINUED | OUTPATIENT
Start: 2019-07-16 | End: 2019-07-16

## 2019-07-16 RX ORDER — 0.9 % SODIUM CHLORIDE 0.9 %
2 VIAL (ML) INJECTION PRN
Status: DISCONTINUED | OUTPATIENT
Start: 2019-07-16 | End: 2019-07-16 | Stop reason: HOSPADM

## 2019-07-16 RX ORDER — SODIUM CHLORIDE, SODIUM LACTATE, POTASSIUM CHLORIDE, CALCIUM CHLORIDE 600; 310; 30; 20 MG/100ML; MG/100ML; MG/100ML; MG/100ML
INJECTION, SOLUTION INTRAVENOUS CONTINUOUS
Status: DISCONTINUED | OUTPATIENT
Start: 2019-07-16 | End: 2019-07-16 | Stop reason: HOSPADM

## 2019-07-16 RX ORDER — GLYCOPYRROLATE 0.2 MG/ML
INJECTION, SOLUTION INTRAMUSCULAR; INTRAVENOUS PRN
Status: DISCONTINUED | OUTPATIENT
Start: 2019-07-16 | End: 2019-07-16

## 2019-07-16 RX ORDER — ONDANSETRON 2 MG/ML
4 INJECTION INTRAMUSCULAR; INTRAVENOUS 2 TIMES DAILY PRN
Status: DISCONTINUED | OUTPATIENT
Start: 2019-07-16 | End: 2019-07-16 | Stop reason: HOSPADM

## 2019-07-16 RX ORDER — 0.9 % SODIUM CHLORIDE 0.9 %
2 VIAL (ML) INJECTION EVERY 12 HOURS SCHEDULED
Status: DISCONTINUED | OUTPATIENT
Start: 2019-07-16 | End: 2019-07-16 | Stop reason: HOSPADM

## 2019-07-16 RX ADMIN — PROPOFOL 50 MG: 10 INJECTION, EMULSION INTRAVENOUS at 15:48

## 2019-07-16 RX ADMIN — PROPOFOL 100 MG: 10 INJECTION, EMULSION INTRAVENOUS at 15:18

## 2019-07-16 RX ADMIN — GLYCOPYRROLATE 0.2 MG: 0.2 INJECTION, SOLUTION INTRAMUSCULAR; INTRAVENOUS at 15:35

## 2019-07-16 RX ADMIN — PROPOFOL 50 MG: 10 INJECTION, EMULSION INTRAVENOUS at 15:22

## 2019-07-16 RX ADMIN — PROPOFOL 50 MG: 10 INJECTION, EMULSION INTRAVENOUS at 15:27

## 2019-07-16 RX ADMIN — PROPOFOL 50 MG: 10 INJECTION, EMULSION INTRAVENOUS at 15:24

## 2019-07-16 RX ADMIN — PROPOFOL 50 MG: 10 INJECTION, EMULSION INTRAVENOUS at 15:37

## 2019-07-16 RX ADMIN — SODIUM CHLORIDE, POTASSIUM CHLORIDE, SODIUM LACTATE AND CALCIUM CHLORIDE: 600; 310; 30; 20 INJECTION, SOLUTION INTRAVENOUS at 14:08

## 2019-07-16 RX ADMIN — PROPOFOL 50 MG: 10 INJECTION, EMULSION INTRAVENOUS at 15:42

## 2019-07-16 RX ADMIN — PROPOFOL 50 MG: 10 INJECTION, EMULSION INTRAVENOUS at 15:31

## 2019-07-16 ASSESSMENT — COGNITIVE AND FUNCTIONAL STATUS - GENERAL
ARE YOU BLIND OR DO YOU HAVE SERIOUS DIFFICULTY SEEING, EVEN WHEN WEARING GLASSES: NO
ARE YOU DEAF OR DO YOU HAVE SERIOUS DIFFICULTY  HEARING: NO

## 2019-07-16 ASSESSMENT — ACTIVITIES OF DAILY LIVING (ADL)
ADL_SHORT_OF_BREATH: NO
ADL_SCORE: 12
CHRONIC_PAIN_PRESENT: NO
HISTORY OF FALLING IN THE LAST YEAR (PRIOR TO ADMISSION): NO
RECENT_DECLINE_ADL: NO
ADL_BEFORE_ADMISSION: INDEPENDENT
NEEDS_ASSIST: NO

## 2019-07-16 ASSESSMENT — LIFESTYLE VARIABLES
SMOKING_STATUS: FORMER
SMOKING_HISTORY: NO

## 2019-07-16 ASSESSMENT — PAIN SCALES - GENERAL
PAIN_LEVEL_WITH_ACTIVITY: 0
PAIN_LEVEL_AT_REST: 0
PAIN_LEVEL_AT_REST: 0

## 2019-07-18 ENCOUNTER — TELEPHONE (OUTPATIENT)
Dept: SURGERY | Age: 74
End: 2019-07-18

## 2019-07-18 LAB — PATHOLOGIST NAME: NORMAL

## 2019-08-27 ENCOUNTER — TELEPHONE (OUTPATIENT)
Dept: FAMILY MEDICINE | Age: 74
End: 2019-08-27

## 2019-08-27 RX ORDER — LOSARTAN POTASSIUM 25 MG/1
25 TABLET ORAL DAILY
Qty: 90 TABLET | Refills: 2 | Status: SHIPPED | OUTPATIENT
Start: 2019-08-27 | End: 2020-04-27

## 2019-09-30 ENCOUNTER — RX ONLY (RX ONLY)
Age: 74
End: 2019-09-30

## 2019-09-30 RX ORDER — LIDOCAINE 4 G/100G
4% CREAM TOPICAL
Qty: 1 | Refills: 2 | Status: ERX | COMMUNITY
Start: 2019-09-30

## 2019-10-28 ENCOUNTER — APPOINTMENT (OUTPATIENT)
Age: 74
Setting detail: DERMATOLOGY
End: 2019-10-28

## 2019-10-28 DIAGNOSIS — Z85.828 PERSONAL HISTORY OF OTHER MALIGNANT NEOPLASM OF SKIN: ICD-10-CM

## 2019-10-28 DIAGNOSIS — L57.0 ACTINIC KERATOSIS: ICD-10-CM

## 2019-10-28 DIAGNOSIS — L82.1 OTHER SEBORRHEIC KERATOSIS: ICD-10-CM

## 2019-10-28 PROBLEM — D48.5 NEOPLASM OF UNCERTAIN BEHAVIOR OF SKIN: Status: ACTIVE | Noted: 2019-10-28

## 2019-10-28 PROCEDURE — OTHER MIPS QUALITY: OTHER

## 2019-10-28 PROCEDURE — OTHER REASSURANCE: OTHER

## 2019-10-28 PROCEDURE — 99213 OFFICE O/P EST LOW 20 MIN: CPT | Mod: 25

## 2019-10-28 PROCEDURE — OTHER BIOPSY BY PUNCH METHOD: OTHER

## 2019-10-28 PROCEDURE — 11104 PUNCH BX SKIN SINGLE LESION: CPT

## 2019-10-28 PROCEDURE — OTHER COUNSELING: OTHER

## 2019-10-28 PROCEDURE — 17000 DESTRUCT PREMALG LESION: CPT | Mod: 59

## 2019-10-28 PROCEDURE — OTHER LIQUID NITROGEN: OTHER

## 2019-10-28 ASSESSMENT — LOCATION ZONE DERM
LOCATION ZONE: ARM
LOCATION ZONE: FACE

## 2019-10-28 ASSESSMENT — LOCATION SIMPLE DESCRIPTION DERM
LOCATION SIMPLE: LEFT FOREARM
LOCATION SIMPLE: RIGHT EYEBROW
LOCATION SIMPLE: RIGHT CHEEK

## 2019-10-28 ASSESSMENT — LOCATION DETAILED DESCRIPTION DERM
LOCATION DETAILED: RIGHT LATERAL EYEBROW
LOCATION DETAILED: LEFT DISTAL DORSAL FOREARM
LOCATION DETAILED: RIGHT CENTRAL MALAR CHEEK

## 2019-10-28 NOTE — PROCEDURE: MIPS QUALITY
Quality 110: Preventive Care And Screening: Influenza Immunization: Influenza Immunization previously received during influenza season
Quality 226: Preventive Care And Screening: Tobacco Use: Screening And Cessation Intervention: Patient screened for tobacco use and is an ex/non-smoker
Quality 111:Pneumonia Vaccination Status For Older Adults: Pneumococcal Vaccination not Administered or Previously Received, Reason not Otherwise Specified
Quality 474: Zoster Vaccination Status: Shingrix Vaccination not Administered or Previously Received, Reason not Otherwise Specified
Quality 47: Advance Care Plan: Advance care planning not documented, reason not otherwise specified.
Detail Level: Detailed
Quality 130: Documentation Of Current Medications In The Medical Record: Current Medications Documented

## 2019-10-28 NOTE — PROCEDURE: BIOPSY BY PUNCH METHOD
X Size Of Lesion In Cm (Optional): 0
Render Post-Care Instructions In Note?: no
Punch Size In Mm: 4
Billing Type: Third-Party Bill
Epidermal Sutures: 3-0 Nylon
Biopsy Type: H and E
Notification Instructions: Patient will be notified of biopsy results. However, patient instructed to call the office if not contacted within 2 weeks.
Post-Care Instructions: I reviewed with the patient in detail post-care instructions. Patient is to keep the biopsy site dry overnight, and then apply bacitracin twice daily until healed.
Consent: Written consent was obtained and risks were reviewed including but not limited to scarring, infection, bleeding, scabbing, incomplete removal, nerve damage and allergy to anesthesia.
Anesthesia Type: 1% lidocaine with epinephrine and a 1:10 solution of 8.4% sodium bicarbonate
Home Suture Removal Text: Patient was provided a home suture removal kit and will remove their sutures at home.  If they have any questions or difficulties they will call the office.
Detail Level: Detailed
Dressing: bandage
Hemostasis: None
Wound Care: Bacitracin
Suture Removal: 10 days
Anesthesia Volume In Cc (Will Not Render If 0): 0.5
Was A Bandage Applied: Yes

## 2019-11-05 ENCOUNTER — TELEPHONE (OUTPATIENT)
Dept: FAMILY MEDICINE | Age: 74
End: 2019-11-05

## 2019-11-05 RX ORDER — AZITHROMYCIN 250 MG/1
TABLET, FILM COATED ORAL
Qty: 6 TABLET | Refills: 0 | Status: SHIPPED | OUTPATIENT
Start: 2019-11-05 | End: 2019-11-10

## 2019-11-06 ENCOUNTER — APPOINTMENT (OUTPATIENT)
Age: 74
Setting detail: DERMATOLOGY
End: 2019-11-06

## 2019-11-06 DIAGNOSIS — Z48.02 ENCOUNTER FOR REMOVAL OF SUTURES: ICD-10-CM

## 2019-11-06 PROBLEM — C44.329 SQUAMOUS CELL CARCINOMA OF SKIN OF OTHER PARTS OF FACE: Status: ACTIVE | Noted: 2019-11-06

## 2019-11-06 PROCEDURE — OTHER REFERRAL: OTHER

## 2019-11-06 PROCEDURE — OTHER CONSULTATION FOR MOHS SURGERY: OTHER

## 2019-11-06 PROCEDURE — 99024 POSTOP FOLLOW-UP VISIT: CPT

## 2019-11-06 PROCEDURE — OTHER SUTURE REMOVAL (GLOBAL PERIOD): OTHER

## 2019-11-06 PROCEDURE — 99212 OFFICE O/P EST SF 10 MIN: CPT | Mod: 24

## 2019-11-06 PROCEDURE — OTHER ADDITIONAL NOTES: OTHER

## 2019-11-06 PROCEDURE — OTHER PATHOLOGY DISCUSSION: OTHER

## 2019-11-06 ASSESSMENT — LOCATION DETAILED DESCRIPTION DERM: LOCATION DETAILED: RIGHT CENTRAL MALAR CHEEK

## 2019-11-06 ASSESSMENT — LOCATION ZONE DERM: LOCATION ZONE: FACE

## 2019-11-06 ASSESSMENT — LOCATION SIMPLE DESCRIPTION DERM: LOCATION SIMPLE: RIGHT CHEEK

## 2019-11-06 NOTE — PROCEDURE: CONSULTATION FOR MOHS SURGERY
Detail Level: Detailed
Size Of Lesion: 0.5
Incorporate Mauc In Note: Yes
X Size Of Lesion In Cm (Optional): 0

## 2019-11-06 NOTE — PROCEDURE: ADDITIONAL NOTES
Additional Notes: Referred to Dr. ALLRED for mohs to the right central malar cheek via intramail. Referred to Dr. Argueta for plastic/closure via fax due to no intramail.
Detail Level: Simple

## 2019-11-06 NOTE — PROCEDURE: SUTURE REMOVAL (GLOBAL PERIOD)
Add 42109 Cpt? (Important Note: In 2017 The Use Of 76038 Is Being Tracked By Cms To Determine Future Global Period Reimbursement For Global Periods): yes
Detail Level: Detailed

## 2019-12-20 ENCOUNTER — APPOINTMENT (OUTPATIENT)
Age: 74
Setting detail: DERMATOLOGY
End: 2019-12-21

## 2019-12-20 DIAGNOSIS — D22 MELANOCYTIC NEVI: ICD-10-CM

## 2019-12-20 DIAGNOSIS — L57.0 ACTINIC KERATOSIS: ICD-10-CM

## 2019-12-20 PROBLEM — D48.5 NEOPLASM OF UNCERTAIN BEHAVIOR OF SKIN: Status: ACTIVE | Noted: 2019-12-20

## 2019-12-20 PROBLEM — D22.62 MELANOCYTIC NEVI OF LEFT UPPER LIMB, INCLUDING SHOULDER: Status: ACTIVE | Noted: 2019-12-20

## 2019-12-20 PROBLEM — D22.5 MELANOCYTIC NEVI OF TRUNK: Status: ACTIVE | Noted: 2019-12-20

## 2019-12-20 PROBLEM — D22.61 MELANOCYTIC NEVI OF RIGHT UPPER LIMB, INCLUDING SHOULDER: Status: ACTIVE | Noted: 2019-12-20

## 2019-12-20 PROCEDURE — OTHER COUNSELING: OTHER

## 2019-12-20 PROCEDURE — 11104 PUNCH BX SKIN SINGLE LESION: CPT

## 2019-12-20 PROCEDURE — 99213 OFFICE O/P EST LOW 20 MIN: CPT | Mod: 25

## 2019-12-20 PROCEDURE — OTHER BIOPSY BY PUNCH METHOD: OTHER

## 2019-12-20 PROCEDURE — OTHER REASSURANCE: OTHER

## 2019-12-20 ASSESSMENT — LOCATION DETAILED DESCRIPTION DERM
LOCATION DETAILED: LEFT DISTAL DORSAL FOREARM
LOCATION DETAILED: RIGHT ANTERIOR DISTAL UPPER ARM
LOCATION DETAILED: RIGHT SUPERIOR LATERAL MIDBACK
LOCATION DETAILED: LEFT SUPERIOR UPPER BACK
LOCATION DETAILED: LEFT ANTERIOR DISTAL UPPER ARM
LOCATION DETAILED: LEFT INFERIOR CENTRAL MALAR CHEEK
LOCATION DETAILED: RIGHT PROXIMAL DORSAL FOREARM
LOCATION DETAILED: LEFT ANTERIOR SHOULDER
LOCATION DETAILED: RIGHT ANTERIOR SHOULDER

## 2019-12-20 ASSESSMENT — LOCATION ZONE DERM
LOCATION ZONE: FACE
LOCATION ZONE: ARM
LOCATION ZONE: TRUNK

## 2019-12-20 ASSESSMENT — LOCATION SIMPLE DESCRIPTION DERM
LOCATION SIMPLE: LEFT UPPER BACK
LOCATION SIMPLE: LEFT CHEEK
LOCATION SIMPLE: RIGHT FOREARM
LOCATION SIMPLE: RIGHT SHOULDER
LOCATION SIMPLE: LEFT UPPER ARM
LOCATION SIMPLE: LEFT SHOULDER
LOCATION SIMPLE: LEFT FOREARM
LOCATION SIMPLE: RIGHT LOWER BACK
LOCATION SIMPLE: RIGHT UPPER ARM

## 2019-12-20 NOTE — PROCEDURE: BIOPSY BY PUNCH METHOD
Suture Removal: 14 days
Patient Will Remove Sutures At Home?: No
Detail Level: Detailed
Post-Care Instructions: I reviewed with the patient in detail post-care instructions. Patient is to keep the biopsy site dry overnight, and then apply bacitracin twice daily until healed. Patient may apply hydrogen peroxide soaks to remove any crusting.
Size Of Lesion In Cm (Optional): 0
Biopsy Type: H and E
Hemostasis: None
Dressing: bandage
Epidermal Sutures: 4-0 Ethilon
Wound Care: Bacitracin
Anesthesia Type: 1% lidocaine with epinephrine
Consent: Written consent was obtained and risks were reviewed including but not limited to scarring, infection, bleeding, scabbing, incomplete removal, nerve damage and allergy to anesthesia.
Was A Bandage Applied: Yes
Notification Instructions: Patient will be notified of biopsy results. However, patient instructed to call the office if not contacted within 2 weeks.
Billing Type: Third-Party Bill
Anesthesia Volume In Cc (Will Not Render If 0): 0.5
Punch Size In Mm: 2
Home Suture Removal Text: Patient was provided a home suture removal kit and will remove their sutures at home.  If they have any questions or difficulties they will call the office.

## 2019-12-20 NOTE — HPI: SKIN LESION
What Type Of Note Output Would You Prefer (Optional)?: Standard Output
How Severe Is Your Skin Lesion?: mild
Has Your Skin Lesion Been Treated?: not been treated
Is This A New Presentation, Or A Follow-Up?: Skin Lesion
Additional History: Pt states she was sprayed with liquid nitrogen a few weeks ago and the area looks better but it is still scaly and not healing up very fast. Pt just wanted to have it checked out.

## 2019-12-27 ENCOUNTER — APPOINTMENT (OUTPATIENT)
Age: 74
Setting detail: DERMATOLOGY
End: 2019-12-27

## 2019-12-27 DIAGNOSIS — Z48.02 ENCOUNTER FOR REMOVAL OF SUTURES: ICD-10-CM

## 2019-12-27 PROCEDURE — OTHER SUTURE REMOVAL (GLOBAL PERIOD): OTHER

## 2019-12-27 ASSESSMENT — LOCATION ZONE DERM: LOCATION ZONE: FACE

## 2019-12-27 ASSESSMENT — LOCATION SIMPLE DESCRIPTION DERM: LOCATION SIMPLE: LEFT CHEEK

## 2019-12-27 ASSESSMENT — LOCATION DETAILED DESCRIPTION DERM: LOCATION DETAILED: LEFT CENTRAL MALAR CHEEK

## 2019-12-27 NOTE — PROCEDURE: SUTURE REMOVAL (GLOBAL PERIOD)
Detail Level: Detailed
Add 77603 Cpt? (Important Note: In 2017 The Use Of 35297 Is Being Tracked By Cms To Determine Future Global Period Reimbursement For Global Periods): no

## 2020-03-18 ENCOUNTER — APPOINTMENT (OUTPATIENT)
Age: 75
Setting detail: DERMATOLOGY
End: 2020-03-18

## 2020-03-18 PROBLEM — D04.39 CARCINOMA IN SITU OF SKIN OF OTHER PARTS OF FACE: Status: ACTIVE | Noted: 2020-03-18

## 2020-03-18 PROBLEM — C44.329 SQUAMOUS CELL CARCINOMA OF SKIN OF OTHER PARTS OF FACE: Status: ACTIVE | Noted: 2020-03-18

## 2020-03-18 PROCEDURE — OTHER MOHS SURGERY: OTHER

## 2020-03-18 PROCEDURE — OTHER ADDITIONAL NOTES: OTHER

## 2020-03-18 PROCEDURE — 17311 MOHS 1 STAGE H/N/HF/G: CPT

## 2020-03-18 PROCEDURE — OTHER REFERRAL: OTHER

## 2020-03-18 PROCEDURE — 17311 MOHS 1 STAGE H/N/HF/G: CPT | Mod: 76

## 2020-04-06 ENCOUNTER — APPOINTMENT (OUTPATIENT)
Dept: LAB | Age: 75
End: 2020-04-06

## 2020-04-13 ENCOUNTER — APPOINTMENT (OUTPATIENT)
Dept: FAMILY MEDICINE | Age: 75
End: 2020-04-13

## 2020-04-26 DIAGNOSIS — E89.0 POSTOPERATIVE HYPOTHYROIDISM: ICD-10-CM

## 2020-04-26 DIAGNOSIS — K21.9 GASTROESOPHAGEAL REFLUX DISEASE, ESOPHAGITIS PRESENCE NOT SPECIFIED: ICD-10-CM

## 2020-04-27 RX ORDER — LEVOTHYROXINE SODIUM 88 UG/1
88 TABLET ORAL DAILY
Qty: 90 TABLET | Refills: 0 | Status: SHIPPED | OUTPATIENT
Start: 2020-04-27 | End: 2020-06-23 | Stop reason: SDUPTHER

## 2020-04-27 RX ORDER — OMEPRAZOLE 20 MG/1
20 CAPSULE, DELAYED RELEASE ORAL DAILY
Qty: 90 CAPSULE | Refills: 0 | Status: SHIPPED | OUTPATIENT
Start: 2020-04-27 | End: 2020-06-23 | Stop reason: SDUPTHER

## 2020-04-27 RX ORDER — LOSARTAN POTASSIUM 25 MG/1
25 TABLET ORAL DAILY
Qty: 90 TABLET | Refills: 0 | Status: SHIPPED | OUTPATIENT
Start: 2020-04-27 | End: 2020-06-23 | Stop reason: SDUPTHER

## 2020-05-14 ENCOUNTER — APPOINTMENT (OUTPATIENT)
Age: 75
Setting detail: DERMATOLOGY
End: 2020-05-15

## 2020-05-14 DIAGNOSIS — Z85.828 PERSONAL HISTORY OF OTHER MALIGNANT NEOPLASM OF SKIN: ICD-10-CM

## 2020-05-14 DIAGNOSIS — L57.0 ACTINIC KERATOSIS: ICD-10-CM

## 2020-05-14 DIAGNOSIS — L82.0 INFLAMED SEBORRHEIC KERATOSIS: ICD-10-CM

## 2020-05-14 PROBLEM — D48.5 NEOPLASM OF UNCERTAIN BEHAVIOR OF SKIN: Status: ACTIVE | Noted: 2020-05-14

## 2020-05-14 PROCEDURE — 99214 OFFICE O/P EST MOD 30 MIN: CPT | Mod: 25

## 2020-05-14 PROCEDURE — 17000 DESTRUCT PREMALG LESION: CPT | Mod: 59

## 2020-05-14 PROCEDURE — OTHER REASSURANCE: OTHER

## 2020-05-14 PROCEDURE — OTHER BIOPSY BY SHAVE METHOD: OTHER

## 2020-05-14 PROCEDURE — OTHER LIQUID NITROGEN: OTHER

## 2020-05-14 PROCEDURE — OTHER COUNSELING: OTHER

## 2020-05-14 PROCEDURE — 11102 TANGNTL BX SKIN SINGLE LES: CPT | Mod: 59

## 2020-05-14 PROCEDURE — 17110 DESTRUCT B9 LESION 1-14: CPT

## 2020-05-14 ASSESSMENT — LOCATION DETAILED DESCRIPTION DERM
LOCATION DETAILED: LEFT MID TEMPLE
LOCATION DETAILED: LEFT SUPERIOR CENTRAL BUCCAL CHEEK
LOCATION DETAILED: RIGHT CENTRAL EYEBROW
LOCATION DETAILED: RIGHT CENTRAL TEMPLE
LOCATION DETAILED: LEFT INFERIOR TEMPLE

## 2020-05-14 ASSESSMENT — LOCATION SIMPLE DESCRIPTION DERM
LOCATION SIMPLE: LEFT CHEEK
LOCATION SIMPLE: RIGHT EYEBROW
LOCATION SIMPLE: LEFT TEMPLE
LOCATION SIMPLE: RIGHT TEMPLE

## 2020-05-14 ASSESSMENT — LOCATION ZONE DERM: LOCATION ZONE: FACE

## 2020-05-14 NOTE — PROCEDURE: LIQUID NITROGEN
Medical Necessity Clause: This procedure was medically necessary because the lesions that were treated were:
Duration Of Freeze Thaw-Cycle (Seconds): 0
Render Note In Bullet Format When Appropriate: No
Medical Necessity Information: It is in your best interest to select a reason for this procedure from the list below. All of these items fulfill various CMS LCD requirements except the new and changing color options.
Detail Level: Detailed
Consent: The patient's consent was obtained including but not limited to risks of crusting, scabbing, blistering, scarring, darker or lighter pigmentary change, recurrence, incomplete removal and infection.
Post-Care Instructions: I reviewed with the patient in detail post-care instructions. Patient is to wear sunprotection, and avoid picking at any of the treated lesions. Pt may apply Vaseline to crusted or scabbing areas.

## 2020-05-14 NOTE — HPI: SECONDARY COMPLAINT
How Severe Is This Condition?: moderate
Additional History: Patient states “this area has been treated with liquid nitrogen in the past but it just keeps coming back. “

## 2020-05-14 NOTE — PROCEDURE: BIOPSY BY SHAVE METHOD
Detail Level: Detailed
Hide Additional Anticipated Plan?: No
X Size Of Lesion In Cm: 0
Curettage Text: The wound bed was treated with curettage after the biopsy was performed.
Billing Type: Third-Party Bill
Notification Instructions: Patient will be notified of biopsy results. However, patient instructed to call the office if not contacted within 2 weeks.
Electrodesiccation Text: The wound bed was treated with electrodesiccation after the biopsy was performed.
Post-Care Instructions: I reviewed with the patient in detail post-care instructions. Patient is to keep the biopsy site dry overnight, and then apply bacitracin twice daily until healed.
Consent: Written consent was obtained and risks were reviewed including but not limited to scarring, infection, bleeding, scabbing, incomplete removal, nerve damage and allergy to anesthesia.
Anesthesia Type: 1% lidocaine with epinephrine and a 1:10 solution of 8.4% sodium bicarbonate
Validate Note Data (See Information Below): Yes
Depth Of Biopsy: dermis
Electrodesiccation And Curettage Text: The wound bed was treated with electrodesiccation and curettage after the biopsy was performed.
Information: Selecting Yes will display possible errors in your note based on the variables you have selected. This validation is only offered as a suggestion for you. PLEASE NOTE THAT THE VALIDATION TEXT WILL BE REMOVED WHEN YOU FINALIZE YOUR NOTE. IF YOU WANT TO FAX A PRELIMINARY NOTE YOU WILL NEED TO TOGGLE THIS TO 'NO' IF YOU DO NOT WANT IT IN YOUR FAXED NOTE.
Wound Care: Bacitracin
Biopsy Type: H and E
Cryotherapy Text: The wound bed was treated with cryotherapy after the biopsy was performed.
Anesthesia Volume In Cc (Will Not Render If 0): 0.5
Type Of Destruction Used: Curettage
Hemostasis: Aluminum Chloride
Silver Nitrate Text: The wound bed was treated with silver nitrate after the biopsy was performed.
Dressing: bandage
Biopsy Method: Dermablade

## 2020-06-02 ENCOUNTER — TELEPHONE (OUTPATIENT)
Dept: FAMILY MEDICINE | Age: 75
End: 2020-06-02

## 2020-06-04 ENCOUNTER — APPOINTMENT (OUTPATIENT)
Age: 75
Setting detail: DERMATOLOGY
End: 2020-06-05

## 2020-06-04 DIAGNOSIS — L82.0 INFLAMED SEBORRHEIC KERATOSIS: ICD-10-CM

## 2020-06-04 PROBLEM — D04.39 CARCINOMA IN SITU OF SKIN OF OTHER PARTS OF FACE: Status: ACTIVE | Noted: 2020-06-04

## 2020-06-04 PROCEDURE — OTHER PRESCRIPTION: OTHER

## 2020-06-04 PROCEDURE — OTHER REASSURANCE: OTHER

## 2020-06-04 PROCEDURE — 99212 OFFICE O/P EST SF 10 MIN: CPT | Mod: 25

## 2020-06-04 PROCEDURE — OTHER PATHOLOGY DISCUSSION: OTHER

## 2020-06-04 PROCEDURE — OTHER COUNSELING: OTHER

## 2020-06-04 PROCEDURE — OTHER CRYOSURGICAL DESTRUCTION: OTHER

## 2020-06-04 PROCEDURE — 17283 DSTR MAL LS F/E/E/N/L/M2.1-3: CPT

## 2020-06-04 RX ORDER — AMMONIUM LACTATE 12 G/100G
12% LOTION TOPICAL QD
Qty: 1 | Refills: 3 | Status: ERX | COMMUNITY
Start: 2020-06-04

## 2020-06-04 ASSESSMENT — LOCATION ZONE DERM: LOCATION ZONE: TRUNK

## 2020-06-04 ASSESSMENT — LOCATION SIMPLE DESCRIPTION DERM
LOCATION SIMPLE: LOWER BACK
LOCATION SIMPLE: RIGHT UPPER BACK

## 2020-06-04 ASSESSMENT — LOCATION DETAILED DESCRIPTION DERM
LOCATION DETAILED: RIGHT SUPERIOR UPPER BACK
LOCATION DETAILED: SUPERIOR LUMBAR SPINE

## 2020-06-04 NOTE — PROCEDURE: CRYOSURGICAL DESTRUCTION
Total Volume (Ccs): 1
Detail Level: Detailed
Size Of Lesion In Cm: 2.7
Add Intralesional Injection: No
Additional Information: (Optional): The wound was cleaned, and a dressing was applied.  The patient received detailed post-op instructions.
Render Post-Care In The Note: Yes
Number Of Freeze-Thaw Cycles: 3 freeze-thaw cycles
Total Time In Minutes: 3 minutes
Bill As A Line Item Or As Units: Line Item
Post-Care Instructions: I reviewed with the patient in detail post-care instructions. Patient is to keep the area dry for 48 hours, and not to engage in any heavy lifting, exercise, or swimming for the next 14 days. Should the patient develop any fevers, chills, bleeding, severe pain patient will contact the office immediately.
Anesthesia Type: 1% lidocaine with epinephrine
Medication Injected: 5-Fluorouracil
Pre-Procedure: The surgical site was antiseptically prepared.
Anesthesia Volume In Cc: 0
Consent was obtained from the patient. The risks and benefits to therapy were discussed in detail. Specifically, the risks of infection, scarring, bleeding, prolonged wound healing, incomplete removal, allergy to anesthesia, nerve injury and recurrence were addressed. Alternatives to liquid nitrogen, such as ED&C, surgical removal, XRT were also discussed.  Prior to the procedure, the treatment site was clearly identified and confirmed by the patient. All components of Universal Protocol/PAUSE Rule completed.

## 2020-06-16 ENCOUNTER — LAB SERVICES (OUTPATIENT)
Dept: LAB | Age: 75
End: 2020-06-16

## 2020-06-16 DIAGNOSIS — E03.9 HYPOTHYROIDISM, UNSPECIFIED TYPE: ICD-10-CM

## 2020-06-16 DIAGNOSIS — E78.5 DYSLIPIDEMIA: ICD-10-CM

## 2020-06-16 DIAGNOSIS — R73.01 IFG (IMPAIRED FASTING GLUCOSE): ICD-10-CM

## 2020-06-16 DIAGNOSIS — E55.9 VITAMIN D DEFICIENCY: ICD-10-CM

## 2020-06-16 LAB
25(OH)D3+25(OH)D2 SERPL-MCNC: 32 NG/ML (ref 30–100)
ALBUMIN SERPL-MCNC: 3.9 G/DL (ref 3.6–5.1)
ALBUMIN/GLOB SERPL: 1.3 {RATIO} (ref 1–2.4)
ALP SERPL-CCNC: 100 UNITS/L (ref 45–117)
ALT SERPL-CCNC: 35 UNITS/L
ANION GAP SERPL CALC-SCNC: 11 MMOL/L (ref 10–20)
AST SERPL-CCNC: 21 UNITS/L
BILIRUB SERPL-MCNC: 0.6 MG/DL (ref 0.2–1)
BUN SERPL-MCNC: 16 MG/DL (ref 6–20)
BUN/CREAT SERPL: 21 (ref 7–25)
CALCIUM SERPL-MCNC: 8.7 MG/DL (ref 8.4–10.2)
CHLORIDE SERPL-SCNC: 105 MMOL/L (ref 98–107)
CHOLEST SERPL-MCNC: 181 MG/DL
CHOLEST/HDLC SERPL: 3.3 {RATIO}
CO2 SERPL-SCNC: 27 MMOL/L (ref 21–32)
CREAT SERPL-MCNC: 0.75 MG/DL (ref 0.51–0.95)
FASTING DURATION TIME PATIENT: 12 HOURS
FASTING DURATION TIME PATIENT: 12 HOURS
GFR SERPLBLD BASED ON 1.73 SQ M-ARVRAT: 78 ML/MIN
GLOBULIN SER-MCNC: 3 G/DL (ref 2–4)
GLUCOSE SERPL-MCNC: 105 MG/DL (ref 65–99)
HBA1C MFR BLD: 5.6 % (ref 4.5–5.6)
HDLC SERPL-MCNC: 55 MG/DL
LDLC SERPL CALC-MCNC: 92 MG/DL
NONHDLC SERPL-MCNC: 126 MG/DL
POTASSIUM SERPL-SCNC: 4.1 MMOL/L (ref 3.4–5.1)
PROT SERPL-MCNC: 6.9 G/DL (ref 6.4–8.2)
SODIUM SERPL-SCNC: 139 MMOL/L (ref 135–145)
TRIGL SERPL-MCNC: 168 MG/DL

## 2020-06-16 PROCEDURE — 80061 LIPID PANEL: CPT | Performed by: CLINICAL MEDICAL LABORATORY

## 2020-06-16 PROCEDURE — 80053 COMPREHEN METABOLIC PANEL: CPT | Performed by: CLINICAL MEDICAL LABORATORY

## 2020-06-16 PROCEDURE — 84443 ASSAY THYROID STIM HORMONE: CPT | Performed by: CLINICAL MEDICAL LABORATORY

## 2020-06-16 PROCEDURE — 83036 HEMOGLOBIN GLYCOSYLATED A1C: CPT | Performed by: CLINICAL MEDICAL LABORATORY

## 2020-06-16 PROCEDURE — 82306 VITAMIN D 25 HYDROXY: CPT | Performed by: CLINICAL MEDICAL LABORATORY

## 2020-06-17 LAB — TSH SERPL-ACNC: 0.68 MCUNITS/ML (ref 0.35–5)

## 2020-06-21 ASSESSMENT — PATIENT HEALTH QUESTIONNAIRE - PHQ9
CLINICAL INTERPRETATION OF PHQ2 SCORE: 0
2. FEELING DOWN, DEPRESSED OR HOPELESS: NOT AT ALL
CLINICAL INTERPRETATION OF PHQ2 SCORE: NO FURTHER SCREENING NEEDED
1. LITTLE INTEREST OR PLEASURE IN DOING THINGS: NOT AT ALL

## 2020-06-23 ENCOUNTER — OFFICE VISIT (OUTPATIENT)
Dept: FAMILY MEDICINE | Age: 75
End: 2020-06-23

## 2020-06-23 VITALS
BODY MASS INDEX: 27.66 KG/M2 | TEMPERATURE: 97.4 F | SYSTOLIC BLOOD PRESSURE: 138 MMHG | WEIGHT: 162 LBS | HEART RATE: 80 BPM | HEIGHT: 64 IN | RESPIRATION RATE: 12 BRPM | DIASTOLIC BLOOD PRESSURE: 74 MMHG

## 2020-06-23 DIAGNOSIS — K21.9 GASTROESOPHAGEAL REFLUX DISEASE, ESOPHAGITIS PRESENCE NOT SPECIFIED: ICD-10-CM

## 2020-06-23 DIAGNOSIS — R03.0 BLOOD PRESSURE ELEVATED WITHOUT HISTORY OF HTN: ICD-10-CM

## 2020-06-23 DIAGNOSIS — Z86.73 HISTORY OF CVA (CEREBROVASCULAR ACCIDENT) WITHOUT RESIDUAL DEFICITS: ICD-10-CM

## 2020-06-23 DIAGNOSIS — E03.9 HYPOTHYROIDISM, UNSPECIFIED TYPE: ICD-10-CM

## 2020-06-23 DIAGNOSIS — Z00.00 MEDICARE ANNUAL WELLNESS VISIT, SUBSEQUENT: Primary | ICD-10-CM

## 2020-06-23 DIAGNOSIS — E89.0 POSTOPERATIVE HYPOTHYROIDISM: ICD-10-CM

## 2020-06-23 DIAGNOSIS — E55.9 VITAMIN D DEFICIENCY: ICD-10-CM

## 2020-06-23 DIAGNOSIS — E78.5 DYSLIPIDEMIA: ICD-10-CM

## 2020-06-23 DIAGNOSIS — R73.01 IFG (IMPAIRED FASTING GLUCOSE): ICD-10-CM

## 2020-06-23 DIAGNOSIS — L71.9 ROSACEA: ICD-10-CM

## 2020-06-23 DIAGNOSIS — M81.0 OSTEOPOROSIS, UNSPECIFIED OSTEOPOROSIS TYPE, UNSPECIFIED PATHOLOGICAL FRACTURE PRESENCE: ICD-10-CM

## 2020-06-23 PROCEDURE — G0439 PPPS, SUBSEQ VISIT: HCPCS | Performed by: FAMILY MEDICINE

## 2020-06-23 PROCEDURE — 99214 OFFICE O/P EST MOD 30 MIN: CPT | Performed by: FAMILY MEDICINE

## 2020-06-23 RX ORDER — OMEPRAZOLE 20 MG/1
20 CAPSULE, DELAYED RELEASE ORAL DAILY
Qty: 90 CAPSULE | Refills: 3 | Status: SHIPPED | OUTPATIENT
Start: 2020-06-23 | End: 2021-06-24 | Stop reason: SDUPTHER

## 2020-06-23 RX ORDER — ATORVASTATIN CALCIUM 10 MG/1
10 TABLET, FILM COATED ORAL DAILY
Qty: 90 TABLET | Refills: 4 | Status: SHIPPED | OUTPATIENT
Start: 2020-06-23 | End: 2021-06-24 | Stop reason: SDUPTHER

## 2020-06-23 RX ORDER — LEVOTHYROXINE SODIUM 0.1 MG/1
100 TABLET ORAL DAILY
Qty: 90 TABLET | Refills: 4 | Status: SHIPPED | OUTPATIENT
Start: 2020-06-23 | End: 2021-06-24 | Stop reason: SDUPTHER

## 2020-06-23 RX ORDER — LOSARTAN POTASSIUM 25 MG/1
25 TABLET ORAL DAILY
Qty: 90 TABLET | Refills: 3 | Status: SHIPPED | OUTPATIENT
Start: 2020-06-23 | End: 2021-06-24 | Stop reason: SDUPTHER

## 2020-06-23 RX ORDER — IBANDRONATE SODIUM 150 MG/1
150 TABLET, FILM COATED ORAL
Qty: 3 TABLET | Refills: 3 | Status: SHIPPED | OUTPATIENT
Start: 2020-06-23 | End: 2021-03-12 | Stop reason: SINTOL

## 2020-06-23 ASSESSMENT — PATIENT HEALTH QUESTIONNAIRE - PHQ9
SUM OF ALL RESPONSES TO PHQ9 QUESTIONS 1 AND 2: 0
CLINICAL INTERPRETATION OF PHQ9 SCORE: NO FURTHER SCREENING NEEDED

## 2020-07-09 ENCOUNTER — IMAGING SERVICES (OUTPATIENT)
Dept: GENERAL RADIOLOGY | Age: 75
End: 2020-07-09
Attending: FAMILY MEDICINE

## 2020-07-09 DIAGNOSIS — M81.0 OSTEOPOROSIS, UNSPECIFIED OSTEOPOROSIS TYPE, UNSPECIFIED PATHOLOGICAL FRACTURE PRESENCE: ICD-10-CM

## 2020-07-09 PROCEDURE — 77080 DXA BONE DENSITY AXIAL: CPT | Performed by: RADIOLOGY

## 2020-07-22 ENCOUNTER — APPOINTMENT (OUTPATIENT)
Age: 75
Setting detail: DERMATOLOGY
End: 2020-07-22

## 2020-07-22 VITALS — WEIGHT: 110 LBS | HEIGHT: 60 IN

## 2020-07-22 DIAGNOSIS — L72.8 OTHER FOLLICULAR CYSTS OF THE SKIN AND SUBCUTANEOUS TISSUE: ICD-10-CM

## 2020-07-22 DIAGNOSIS — F17.200 NICOTINE DEPENDENCE, UNSPECIFIED, UNCOMPLICATED: ICD-10-CM

## 2020-07-22 PROBLEM — D48.5 NEOPLASM OF UNCERTAIN BEHAVIOR OF SKIN: Status: ACTIVE | Noted: 2020-07-22

## 2020-07-22 PROBLEM — D04.39 CARCINOMA IN SITU OF SKIN OF OTHER PARTS OF FACE: Status: ACTIVE | Noted: 2020-07-22

## 2020-07-22 PROCEDURE — OTHER SMOKING CESSATION COUNSELING: OTHER

## 2020-07-22 PROCEDURE — OTHER COUNSELING: OTHER

## 2020-07-22 PROCEDURE — 11104 PUNCH BX SKIN SINGLE LESION: CPT

## 2020-07-22 PROCEDURE — OTHER BIOPSY BY PUNCH METHOD: OTHER

## 2020-07-22 PROCEDURE — OTHER MIPS QUALITY: OTHER

## 2020-07-22 PROCEDURE — 99406 BEHAV CHNG SMOKING 3-10 MIN: CPT

## 2020-07-22 PROCEDURE — 11103 TANGNTL BX SKIN EA SEP/ADDL: CPT

## 2020-07-22 PROCEDURE — OTHER BIOPSY BY SHAVE METHOD: OTHER

## 2020-07-22 PROCEDURE — 99214 OFFICE O/P EST MOD 30 MIN: CPT | Mod: 25

## 2020-07-22 ASSESSMENT — LOCATION DETAILED DESCRIPTION DERM: LOCATION DETAILED: LEFT PROXIMAL DORSAL RING FINGER

## 2020-07-22 ASSESSMENT — LOCATION SIMPLE DESCRIPTION DERM: LOCATION SIMPLE: LEFT RING FINGER

## 2020-07-22 ASSESSMENT — LOCATION ZONE DERM: LOCATION ZONE: FINGER

## 2020-07-22 NOTE — PROCEDURE: BIOPSY BY PUNCH METHOD
Render In Bullet Format When Appropriate: No
Anesthesia Volume In Cc (Will Not Render If 0): 0.5
Additional Anesthesia Volume In Cc (Will Not Render If 0): 0
Anesthesia Type: 1% lidocaine with epinephrine and a 1:10 solution of 8.4% sodium bicarbonate
Consent: Written consent was obtained and risks were reviewed including but not limited to scarring, infection, bleeding, scabbing, incomplete removal, nerve damage and allergy to anesthesia.
Detail Level: Detailed
Wound Care: Bacitracin
Notification Instructions: Patient will be notified of biopsy results. However, patient instructed to call the office if not contacted within 2 weeks.
Post-Care Instructions: I reviewed with the patient in detail post-care instructions. Patient is to keep the biopsy site dry overnight, and then apply bacitracin twice daily until healed.
Billing Type: Third-Party Bill
Home Suture Removal Text: Patient was provided a home suture removal kit and will remove their sutures at home.  If they have any questions or difficulties they will call the office.
Suture Removal: 10 days
Epidermal Sutures: 3-0 Nylon
Validate Note Data (See Information Below): Yes
Punch Size In Mm: 3
Biopsy Type: H and E
Dressing: bandage
Information: Selecting Yes will display possible errors in your note based on the variables you have selected. This validation is only offered as a suggestion for you. PLEASE NOTE THAT THE VALIDATION TEXT WILL BE REMOVED WHEN YOU FINALIZE YOUR NOTE. IF YOU WANT TO FAX A PRELIMINARY NOTE YOU WILL NEED TO TOGGLE THIS TO 'NO' IF YOU DO NOT WANT IT IN YOUR FAXED NOTE.
Hemostasis: None

## 2020-07-22 NOTE — PROCEDURE: BIOPSY BY SHAVE METHOD
Validate Anticipated Plan: No
Anesthesia Volume In Cc (Will Not Render If 0): 0.5
Cryotherapy Text: The wound bed was treated with cryotherapy after the biopsy was performed.
Billing Type: Third-Party Bill
Hemostasis: Aluminum Chloride
Was A Bandage Applied: Yes
Post-Care Instructions: I reviewed with the patient in detail post-care instructions. Patient is to keep the biopsy site dry overnight, and then apply bacitracin twice daily until healed.
Wound Care: Bacitracin
Dressing: bandage
X Size Of Lesion In Cm: 0
Curettage Text: The wound bed was treated with curettage after the biopsy was performed.
Silver Nitrate Text: The wound bed was treated with silver nitrate after the biopsy was performed.
Detail Level: Detailed
Anesthesia Type: 1% lidocaine with epinephrine and a 1:10 solution of 8.4% sodium bicarbonate
Consent: Written consent was obtained and risks were reviewed including but not limited to scarring, infection, bleeding, scabbing, incomplete removal, nerve damage and allergy to anesthesia.
Information: Selecting Yes will display possible errors in your note based on the variables you have selected. This validation is only offered as a suggestion for you. PLEASE NOTE THAT THE VALIDATION TEXT WILL BE REMOVED WHEN YOU FINALIZE YOUR NOTE. IF YOU WANT TO FAX A PRELIMINARY NOTE YOU WILL NEED TO TOGGLE THIS TO 'NO' IF YOU DO NOT WANT IT IN YOUR FAXED NOTE.
Electrodesiccation Text: The wound bed was treated with electrodesiccation after the biopsy was performed.
Electrodesiccation And Curettage Text: The wound bed was treated with electrodesiccation and curettage after the biopsy was performed.
Depth Of Biopsy: dermis
Type Of Destruction Used: Curettage
Biopsy Type: H and E
Biopsy Method: 15 blade
Notification Instructions: Patient will be notified of biopsy results. However, patient instructed to call the office if not contacted within 2 weeks.

## 2020-07-22 NOTE — PROCEDURE: MIPS QUALITY
Quality 47: Advance Care Plan: Advance Care Planning discussed and documented in the medical record; patient did not wish or was not able to name a surrogate decision maker or provide an advance care plan.
Quality 111:Pneumonia Vaccination Status For Older Adults: Pneumococcal Vaccination Previously Received
Quality 130: Documentation Of Current Medications In The Medical Record: Current Medications Documented
Quality 110: Preventive Care And Screening: Influenza Immunization: Influenza Immunization previously received during influenza season
Detail Level: Detailed
Quality 226: Preventive Care And Screening: Tobacco Use: Screening And Cessation Intervention: Patient screened for tobacco use, is a smoker AND received Cessation Counseling

## 2020-08-04 ENCOUNTER — APPOINTMENT (OUTPATIENT)
Age: 75
Setting detail: DERMATOLOGY
End: 2020-08-04

## 2020-08-04 DIAGNOSIS — Z48.02 ENCOUNTER FOR REMOVAL OF SUTURES: ICD-10-CM

## 2020-08-04 PROCEDURE — 99024 POSTOP FOLLOW-UP VISIT: CPT

## 2020-08-04 PROCEDURE — OTHER SUTURE REMOVAL (GLOBAL PERIOD): OTHER

## 2020-08-04 ASSESSMENT — LOCATION DETAILED DESCRIPTION DERM
LOCATION DETAILED: LEFT PROXIMAL DORSAL RING FINGER
LOCATION DETAILED: RIGHT RADIAL DORSAL HAND

## 2020-08-04 ASSESSMENT — LOCATION SIMPLE DESCRIPTION DERM
LOCATION SIMPLE: RIGHT HAND
LOCATION SIMPLE: LEFT RING FINGER

## 2020-08-04 ASSESSMENT — LOCATION ZONE DERM
LOCATION ZONE: FINGER
LOCATION ZONE: HAND

## 2020-08-04 NOTE — PROCEDURE: SUTURE REMOVAL (GLOBAL PERIOD)
Detail Level: Detailed
Add 33046 Cpt? (Important Note: In 2017 The Use Of 04853 Is Being Tracked By Cms To Determine Future Global Period Reimbursement For Global Periods): yes

## 2020-08-06 ENCOUNTER — APPOINTMENT (OUTPATIENT)
Age: 75
Setting detail: DERMATOLOGY
End: 2020-08-07

## 2020-08-06 VITALS — HEIGHT: 55 IN | WEIGHT: 113 LBS

## 2020-08-06 DIAGNOSIS — L57.0 ACTINIC KERATOSIS: ICD-10-CM

## 2020-08-06 PROCEDURE — 17000 DESTRUCT PREMALG LESION: CPT

## 2020-08-06 PROCEDURE — OTHER COUNSELING: OTHER

## 2020-08-06 PROCEDURE — 17003 DESTRUCT PREMALG LES 2-14: CPT

## 2020-08-06 PROCEDURE — OTHER REASSURANCE: OTHER

## 2020-08-06 PROCEDURE — OTHER LIQUID NITROGEN: OTHER

## 2020-08-06 PROCEDURE — OTHER PATHOLOGY DISCUSSION: OTHER

## 2020-08-06 ASSESSMENT — LOCATION DETAILED DESCRIPTION DERM
LOCATION DETAILED: RIGHT RADIAL DORSAL HAND
LOCATION DETAILED: LEFT RADIAL DORSAL HAND
LOCATION DETAILED: LEFT PROXIMAL DORSAL RING FINGER

## 2020-08-06 ASSESSMENT — LOCATION SIMPLE DESCRIPTION DERM
LOCATION SIMPLE: LEFT HAND
LOCATION SIMPLE: RIGHT HAND
LOCATION SIMPLE: LEFT RING FINGER

## 2020-08-06 ASSESSMENT — LOCATION ZONE DERM
LOCATION ZONE: HAND
LOCATION ZONE: FINGER

## 2021-03-12 ENCOUNTER — IMAGING SERVICES (OUTPATIENT)
Dept: GENERAL RADIOLOGY | Age: 76
End: 2021-03-12
Attending: FAMILY MEDICINE

## 2021-03-12 ENCOUNTER — OFFICE VISIT (OUTPATIENT)
Dept: FAMILY MEDICINE | Age: 76
End: 2021-03-12

## 2021-03-12 DIAGNOSIS — D68.51 FACTOR V LEIDEN (CMD): ICD-10-CM

## 2021-03-12 DIAGNOSIS — M25.511 ACUTE PAIN OF RIGHT SHOULDER: ICD-10-CM

## 2021-03-12 DIAGNOSIS — M25.511 ACUTE PAIN OF RIGHT SHOULDER: Primary | ICD-10-CM

## 2021-03-12 PROCEDURE — 99214 OFFICE O/P EST MOD 30 MIN: CPT | Performed by: FAMILY MEDICINE

## 2021-03-12 PROCEDURE — 73030 X-RAY EXAM OF SHOULDER: CPT | Performed by: RADIOLOGY

## 2021-03-12 RX ORDER — MELOXICAM 15 MG/1
15 TABLET ORAL DAILY
Qty: 20 TABLET | Refills: 0 | Status: SHIPPED | OUTPATIENT
Start: 2021-03-12 | End: 2021-04-07 | Stop reason: ALTCHOICE

## 2021-03-12 RX ORDER — FLUTICASONE PROPIONATE 0.05 %
CREAM (GRAM) TOPICAL
Qty: 15 G | Refills: 0 | Status: SHIPPED | OUTPATIENT
Start: 2021-03-12

## 2021-03-12 ASSESSMENT — PAIN SCALES - GENERAL: PAINLEVEL: 7-8

## 2021-04-05 ENCOUNTER — TELEPHONE (OUTPATIENT)
Dept: FAMILY MEDICINE | Age: 76
End: 2021-04-05

## 2021-04-07 ENCOUNTER — OFFICE VISIT (OUTPATIENT)
Dept: FAMILY MEDICINE | Age: 76
End: 2021-04-07

## 2021-04-07 VITALS
HEART RATE: 77 BPM | WEIGHT: 160 LBS | DIASTOLIC BLOOD PRESSURE: 82 MMHG | BODY MASS INDEX: 27.31 KG/M2 | OXYGEN SATURATION: 97 % | TEMPERATURE: 98 F | SYSTOLIC BLOOD PRESSURE: 126 MMHG | HEIGHT: 64 IN | RESPIRATION RATE: 16 BRPM

## 2021-04-07 DIAGNOSIS — M25.511 RIGHT SHOULDER PAIN, UNSPECIFIED CHRONICITY: Primary | ICD-10-CM

## 2021-04-07 PROCEDURE — 99213 OFFICE O/P EST LOW 20 MIN: CPT | Performed by: FAMILY MEDICINE

## 2021-04-07 PROCEDURE — 20610 DRAIN/INJ JOINT/BURSA W/O US: CPT | Performed by: FAMILY MEDICINE

## 2021-04-07 RX ORDER — METHYLPREDNISOLONE ACETATE 40 MG/ML
40 INJECTION, SUSPENSION INTRA-ARTICULAR; INTRALESIONAL; INTRAMUSCULAR; SOFT TISSUE ONCE
Status: COMPLETED | OUTPATIENT
Start: 2021-04-07 | End: 2021-04-07

## 2021-04-07 RX ADMIN — METHYLPREDNISOLONE ACETATE 40 MG: 40 INJECTION, SUSPENSION INTRA-ARTICULAR; INTRALESIONAL; INTRAMUSCULAR; SOFT TISSUE at 09:45

## 2021-04-07 ASSESSMENT — PATIENT HEALTH QUESTIONNAIRE - PHQ9
CLINICAL INTERPRETATION OF PHQ2 SCORE: NO FURTHER SCREENING NEEDED
SUM OF ALL RESPONSES TO PHQ9 QUESTIONS 1 AND 2: 0
2. FEELING DOWN, DEPRESSED OR HOPELESS: NOT AT ALL
CLINICAL INTERPRETATION OF PHQ9 SCORE: NO FURTHER SCREENING NEEDED
1. LITTLE INTEREST OR PLEASURE IN DOING THINGS: NOT AT ALL
SUM OF ALL RESPONSES TO PHQ9 QUESTIONS 1 AND 2: 0

## 2021-05-21 VITALS
RESPIRATION RATE: 16 BRPM | TEMPERATURE: 95.7 F | HEIGHT: 64 IN | OXYGEN SATURATION: 98 % | SYSTOLIC BLOOD PRESSURE: 130 MMHG | WEIGHT: 156 LBS | DIASTOLIC BLOOD PRESSURE: 80 MMHG | HEART RATE: 85 BPM | BODY MASS INDEX: 26.63 KG/M2

## 2021-06-21 ENCOUNTER — LAB SERVICES (OUTPATIENT)
Dept: LAB | Age: 76
End: 2021-06-21

## 2021-06-21 DIAGNOSIS — E03.9 HYPOTHYROIDISM, UNSPECIFIED TYPE: ICD-10-CM

## 2021-06-21 DIAGNOSIS — E55.9 VITAMIN D DEFICIENCY: ICD-10-CM

## 2021-06-21 DIAGNOSIS — R73.01 IFG (IMPAIRED FASTING GLUCOSE): ICD-10-CM

## 2021-06-21 DIAGNOSIS — E78.5 DYSLIPIDEMIA: ICD-10-CM

## 2021-06-21 LAB
HBA1C MFR BLD: 5.8 % (ref 4.5–5.6)
TSH SERPL-ACNC: 0.02 MCUNITS/ML (ref 0.35–5)

## 2021-06-21 PROCEDURE — 82306 VITAMIN D 25 HYDROXY: CPT | Performed by: CLINICAL MEDICAL LABORATORY

## 2021-06-21 PROCEDURE — 83036 HEMOGLOBIN GLYCOSYLATED A1C: CPT | Performed by: CLINICAL MEDICAL LABORATORY

## 2021-06-21 PROCEDURE — 80053 COMPREHEN METABOLIC PANEL: CPT | Performed by: CLINICAL MEDICAL LABORATORY

## 2021-06-21 PROCEDURE — 84443 ASSAY THYROID STIM HORMONE: CPT | Performed by: CLINICAL MEDICAL LABORATORY

## 2021-06-21 PROCEDURE — 36415 COLL VENOUS BLD VENIPUNCTURE: CPT | Performed by: FAMILY MEDICINE

## 2021-06-22 LAB
25(OH)D3+25(OH)D2 SERPL-MCNC: 35.7 NG/ML (ref 30–100)
ALBUMIN SERPL-MCNC: 4 G/DL (ref 3.6–5.1)
ALBUMIN/GLOB SERPL: 1.3 {RATIO} (ref 1–2.4)
ALP SERPL-CCNC: 113 UNITS/L (ref 45–117)
ALT SERPL-CCNC: 31 UNITS/L
ANION GAP SERPL CALC-SCNC: 10 MMOL/L (ref 10–20)
AST SERPL-CCNC: 21 UNITS/L
BILIRUB SERPL-MCNC: 0.5 MG/DL (ref 0.2–1)
BUN SERPL-MCNC: 15 MG/DL (ref 6–20)
BUN/CREAT SERPL: 21 (ref 7–25)
CALCIUM SERPL-MCNC: 8.9 MG/DL (ref 8.4–10.2)
CHLORIDE SERPL-SCNC: 108 MMOL/L (ref 98–107)
CO2 SERPL-SCNC: 26 MMOL/L (ref 21–32)
CREAT SERPL-MCNC: 0.72 MG/DL (ref 0.51–0.95)
FASTING DURATION TIME PATIENT: 12 HOURS
GFR SERPLBLD BASED ON 1.73 SQ M-ARVRAT: 81 ML/MIN/1.73M2
GLOBULIN SER-MCNC: 3 G/DL (ref 2–4)
GLUCOSE SERPL-MCNC: 109 MG/DL (ref 65–99)
POTASSIUM SERPL-SCNC: 4.1 MMOL/L (ref 3.4–5.1)
PROT SERPL-MCNC: 7 G/DL (ref 6.4–8.2)
SODIUM SERPL-SCNC: 140 MMOL/L (ref 135–145)

## 2021-06-27 ASSESSMENT — PATIENT HEALTH QUESTIONNAIRE - PHQ9
CLINICAL INTERPRETATION OF PHQ2 SCORE: NO FURTHER SCREENING NEEDED
CLINICAL INTERPRETATION OF PHQ2 SCORE: 0
1. LITTLE INTEREST OR PLEASURE IN DOING THINGS: NOT AT ALL
2. FEELING DOWN, DEPRESSED OR HOPELESS: NOT AT ALL

## 2021-06-28 ENCOUNTER — OFFICE VISIT (OUTPATIENT)
Dept: FAMILY MEDICINE | Age: 76
End: 2021-06-28

## 2021-06-28 VITALS
DIASTOLIC BLOOD PRESSURE: 80 MMHG | WEIGHT: 156 LBS | TEMPERATURE: 99.8 F | HEART RATE: 68 BPM | HEIGHT: 64 IN | RESPIRATION RATE: 12 BRPM | BODY MASS INDEX: 26.63 KG/M2 | SYSTOLIC BLOOD PRESSURE: 138 MMHG

## 2021-06-28 DIAGNOSIS — M81.0 OSTEOPOROSIS, UNSPECIFIED OSTEOPOROSIS TYPE, UNSPECIFIED PATHOLOGICAL FRACTURE PRESENCE: ICD-10-CM

## 2021-06-28 DIAGNOSIS — R73.01 IFG (IMPAIRED FASTING GLUCOSE): ICD-10-CM

## 2021-06-28 DIAGNOSIS — K21.9 GASTROESOPHAGEAL REFLUX DISEASE, UNSPECIFIED WHETHER ESOPHAGITIS PRESENT: ICD-10-CM

## 2021-06-28 DIAGNOSIS — Z00.00 MEDICARE ANNUAL WELLNESS VISIT, SUBSEQUENT: Primary | ICD-10-CM

## 2021-06-28 DIAGNOSIS — R03.0 BLOOD PRESSURE ELEVATED WITHOUT HISTORY OF HTN: ICD-10-CM

## 2021-06-28 DIAGNOSIS — E89.0 POSTOPERATIVE HYPOTHYROIDISM: ICD-10-CM

## 2021-06-28 DIAGNOSIS — K21.9 GASTROESOPHAGEAL REFLUX DISEASE: ICD-10-CM

## 2021-06-28 DIAGNOSIS — Z86.73 HISTORY OF CVA (CEREBROVASCULAR ACCIDENT) WITHOUT RESIDUAL DEFICITS: ICD-10-CM

## 2021-06-28 DIAGNOSIS — E78.5 DYSLIPIDEMIA: ICD-10-CM

## 2021-06-28 PROCEDURE — G0439 PPPS, SUBSEQ VISIT: HCPCS | Performed by: FAMILY MEDICINE

## 2021-06-28 PROCEDURE — 99397 PER PM REEVAL EST PAT 65+ YR: CPT | Performed by: FAMILY MEDICINE

## 2021-06-28 RX ORDER — ATORVASTATIN CALCIUM 10 MG/1
10 TABLET, FILM COATED ORAL DAILY
Qty: 90 TABLET | Refills: 3 | Status: SHIPPED | OUTPATIENT
Start: 2021-06-28 | End: 2022-06-13 | Stop reason: SDUPTHER

## 2021-06-28 RX ORDER — LEVOTHYROXINE SODIUM 0.1 MG/1
100 TABLET ORAL DAILY
Qty: 90 TABLET | Refills: 3 | Status: SHIPPED | OUTPATIENT
Start: 2021-06-28 | End: 2022-06-13 | Stop reason: SDUPTHER

## 2021-06-28 RX ORDER — LOSARTAN POTASSIUM 25 MG/1
25 TABLET ORAL DAILY
Qty: 90 TABLET | Refills: 3 | Status: SHIPPED | OUTPATIENT
Start: 2021-06-28 | End: 2022-06-13 | Stop reason: SDUPTHER

## 2021-06-28 RX ORDER — OMEPRAZOLE 20 MG/1
20 CAPSULE, DELAYED RELEASE ORAL DAILY
Qty: 90 CAPSULE | Refills: 3 | Status: SHIPPED | OUTPATIENT
Start: 2021-06-28 | End: 2022-06-13 | Stop reason: SDUPTHER

## 2021-06-28 ASSESSMENT — PATIENT HEALTH QUESTIONNAIRE - PHQ9
SUM OF ALL RESPONSES TO PHQ9 QUESTIONS 1 AND 2: 0
CLINICAL INTERPRETATION OF PHQ9 SCORE: NO FURTHER SCREENING NEEDED
1. LITTLE INTEREST OR PLEASURE IN DOING THINGS: NOT AT ALL
2. FEELING DOWN, DEPRESSED OR HOPELESS: NOT AT ALL
CLINICAL INTERPRETATION OF PHQ2 SCORE: NO FURTHER SCREENING NEEDED
SUM OF ALL RESPONSES TO PHQ9 QUESTIONS 1 AND 2: 0

## 2021-07-11 DIAGNOSIS — M81.0 OSTEOPOROSIS, UNSPECIFIED OSTEOPOROSIS TYPE, UNSPECIFIED PATHOLOGICAL FRACTURE PRESENCE: ICD-10-CM

## 2021-07-12 RX ORDER — IBANDRONATE SODIUM 150 MG/1
150 TABLET, FILM COATED ORAL
Qty: 3 TABLET | Refills: 3 | OUTPATIENT
Start: 2021-07-12

## 2021-09-17 ENCOUNTER — NURSE ONLY (OUTPATIENT)
Dept: FAMILY MEDICINE | Age: 76
End: 2021-09-17

## 2021-09-17 DIAGNOSIS — Z23 NEED FOR VACCINATION: Primary | ICD-10-CM

## 2021-09-17 PROCEDURE — G0008 ADMIN INFLUENZA VIRUS VAC: HCPCS | Performed by: FAMILY MEDICINE

## 2021-09-17 PROCEDURE — 90662 IIV NO PRSV INCREASED AG IM: CPT | Performed by: FAMILY MEDICINE

## 2021-10-21 NOTE — PROCEDURE: MOHS SURGERY
Z Plasty Text: The lesion was extirpated to the level of the fat with a #15 scalpel blade.  Given the location of the defect, shape of the defect and the proximity to free margins a Z-plasty was deemed most appropriate for repair.  Using a sterile surgical marker, the appropriate transposition arms of the Z-plasty were drawn incorporating the defect and placing the expected incisions within the relaxed skin tension lines where possible.    The area thus outlined was incised deep to adipose tissue with a #15 scalpel blade.  The skin margins were undermined to an appropriate distance in all directions utilizing iris scissors.  The opposing transposition arms were then transposed into place in opposite direction and anchored with interrupted buried subcutaneous sutures. Double Island Pedicle Flap Text: The defect edges were debeveled with a #15 scalpel blade.  Given the location of the defect, shape of the defect and the proximity to free margins a double island pedicle advancement flap was deemed most appropriate.  Using a sterile surgical marker, an appropriate advancement flap was drawn incorporating the defect, outlining the appropriate donor tissue and placing the expected incisions within the relaxed skin tension lines where possible.    The area thus outlined was incised deep to adipose tissue with a #15 scalpel blade.  The skin margins were undermined to an appropriate distance in all directions around the primary defect and laterally outward around the island pedicle utilizing iris scissors.  There was minimal undermining beneath the pedicle flap.

## 2021-12-03 ENCOUNTER — TELEPHONE (OUTPATIENT)
Dept: FAMILY MEDICINE | Age: 76
End: 2021-12-03

## 2021-12-03 RX ORDER — AZITHROMYCIN 250 MG/1
TABLET, FILM COATED ORAL
Qty: 6 TABLET | Refills: 0 | Status: SHIPPED | OUTPATIENT
Start: 2021-12-03 | End: 2021-12-08

## 2021-12-28 ENCOUNTER — APPOINTMENT (OUTPATIENT)
Dept: LAB | Age: 76
End: 2021-12-28

## 2022-01-07 ENCOUNTER — LAB SERVICES (OUTPATIENT)
Dept: LAB | Age: 77
End: 2022-01-07

## 2022-01-07 DIAGNOSIS — E89.0 POSTOPERATIVE HYPOTHYROIDISM: ICD-10-CM

## 2022-01-07 LAB — TSH SERPL-ACNC: 0.06 MCUNITS/ML (ref 0.35–5)

## 2022-01-07 PROCEDURE — 36415 COLL VENOUS BLD VENIPUNCTURE: CPT | Performed by: FAMILY MEDICINE

## 2022-01-07 PROCEDURE — 84443 ASSAY THYROID STIM HORMONE: CPT | Performed by: CLINICAL MEDICAL LABORATORY

## 2022-04-07 ENCOUNTER — TELEPHONE (OUTPATIENT)
Dept: FAMILY MEDICINE | Age: 77
End: 2022-04-07

## 2022-04-08 ENCOUNTER — OFFICE VISIT (OUTPATIENT)
Dept: FAMILY MEDICINE | Age: 77
End: 2022-04-08

## 2022-04-08 ENCOUNTER — APPOINTMENT (OUTPATIENT)
Dept: FAMILY MEDICINE | Age: 77
End: 2022-04-08

## 2022-04-08 VITALS
DIASTOLIC BLOOD PRESSURE: 76 MMHG | SYSTOLIC BLOOD PRESSURE: 128 MMHG | BODY MASS INDEX: 25.81 KG/M2 | OXYGEN SATURATION: 97 % | WEIGHT: 148 LBS | HEART RATE: 95 BPM

## 2022-04-08 DIAGNOSIS — J06.9 VIRAL URI: Primary | ICD-10-CM

## 2022-04-08 DIAGNOSIS — H04.203 WATERY EYES: ICD-10-CM

## 2022-04-08 PROCEDURE — 99213 OFFICE O/P EST LOW 20 MIN: CPT | Performed by: PHYSICIAN ASSISTANT

## 2022-06-07 ENCOUNTER — LAB SERVICES (OUTPATIENT)
Dept: LAB | Age: 77
End: 2022-06-07

## 2022-06-07 DIAGNOSIS — R73.01 IFG (IMPAIRED FASTING GLUCOSE): ICD-10-CM

## 2022-06-07 DIAGNOSIS — E78.5 DYSLIPIDEMIA: ICD-10-CM

## 2022-06-07 DIAGNOSIS — E89.0 POSTOPERATIVE HYPOTHYROIDISM: ICD-10-CM

## 2022-06-07 PROCEDURE — 36415 COLL VENOUS BLD VENIPUNCTURE: CPT | Performed by: FAMILY MEDICINE

## 2022-06-07 PROCEDURE — 84443 ASSAY THYROID STIM HORMONE: CPT | Performed by: CLINICAL MEDICAL LABORATORY

## 2022-06-07 PROCEDURE — 83036 HEMOGLOBIN GLYCOSYLATED A1C: CPT | Performed by: CLINICAL MEDICAL LABORATORY

## 2022-06-07 PROCEDURE — 80053 COMPREHEN METABOLIC PANEL: CPT | Performed by: CLINICAL MEDICAL LABORATORY

## 2022-06-07 PROCEDURE — 80061 LIPID PANEL: CPT | Performed by: CLINICAL MEDICAL LABORATORY

## 2022-06-08 LAB
ALBUMIN SERPL-MCNC: 4.2 G/DL (ref 3.6–5.1)
ALBUMIN/GLOB SERPL: 1.5 {RATIO} (ref 1–2.4)
ALP SERPL-CCNC: 98 UNITS/L (ref 45–117)
ALT SERPL-CCNC: 25 UNITS/L
ANION GAP SERPL CALC-SCNC: 11 MMOL/L (ref 7–19)
AST SERPL-CCNC: 12 UNITS/L
BILIRUB SERPL-MCNC: 0.9 MG/DL (ref 0.2–1)
BUN SERPL-MCNC: 13 MG/DL (ref 6–20)
BUN/CREAT SERPL: 21 (ref 7–25)
CALCIUM SERPL-MCNC: 9.3 MG/DL (ref 8.4–10.2)
CHLORIDE SERPL-SCNC: 104 MMOL/L (ref 97–110)
CHOLEST SERPL-MCNC: 152 MG/DL
CHOLEST/HDLC SERPL: 2.6 {RATIO}
CO2 SERPL-SCNC: 28 MMOL/L (ref 21–32)
CREAT SERPL-MCNC: 0.61 MG/DL (ref 0.51–0.95)
FASTING DURATION TIME PATIENT: 18 HOURS (ref 0–999)
FASTING DURATION TIME PATIENT: 18 HOURS (ref 0–999)
GFR SERPLBLD BASED ON 1.73 SQ M-ARVRAT: >90 ML/MIN
GLOBULIN SER-MCNC: 2.8 G/DL (ref 2–4)
GLUCOSE SERPL-MCNC: 117 MG/DL (ref 70–99)
HBA1C MFR BLD: 5.7 % (ref 4.5–5.6)
HDLC SERPL-MCNC: 58 MG/DL
LDLC SERPL CALC-MCNC: 59 MG/DL
NONHDLC SERPL-MCNC: 94 MG/DL
POTASSIUM SERPL-SCNC: 4.3 MMOL/L (ref 3.4–5.1)
PROT SERPL-MCNC: 7 G/DL (ref 6.4–8.2)
SODIUM SERPL-SCNC: 139 MMOL/L (ref 135–145)
TRIGL SERPL-MCNC: 175 MG/DL
TSH SERPL-ACNC: 0.09 MCUNITS/ML (ref 0.35–5)

## 2022-06-09 ENCOUNTER — APPOINTMENT (OUTPATIENT)
Dept: LAB | Age: 77
End: 2022-06-09

## 2022-06-13 ENCOUNTER — OFFICE VISIT (OUTPATIENT)
Dept: FAMILY MEDICINE | Age: 77
End: 2022-06-13

## 2022-06-13 VITALS
DIASTOLIC BLOOD PRESSURE: 73 MMHG | RESPIRATION RATE: 16 BRPM | SYSTOLIC BLOOD PRESSURE: 138 MMHG | WEIGHT: 154.4 LBS | TEMPERATURE: 96.8 F | HEART RATE: 77 BPM | BODY MASS INDEX: 26.36 KG/M2 | HEIGHT: 64 IN

## 2022-06-13 DIAGNOSIS — E78.5 DYSLIPIDEMIA: ICD-10-CM

## 2022-06-13 DIAGNOSIS — I10 BENIGN ESSENTIAL HYPERTENSION: ICD-10-CM

## 2022-06-13 DIAGNOSIS — M81.0 OSTEOPOROSIS, UNSPECIFIED OSTEOPOROSIS TYPE, UNSPECIFIED PATHOLOGICAL FRACTURE PRESENCE: ICD-10-CM

## 2022-06-13 DIAGNOSIS — K21.9 GASTROESOPHAGEAL REFLUX DISEASE, UNSPECIFIED WHETHER ESOPHAGITIS PRESENT: ICD-10-CM

## 2022-06-13 DIAGNOSIS — E89.0 POSTOPERATIVE HYPOTHYROIDISM: ICD-10-CM

## 2022-06-13 DIAGNOSIS — Z86.73 HISTORY OF CVA (CEREBROVASCULAR ACCIDENT) WITHOUT RESIDUAL DEFICITS: ICD-10-CM

## 2022-06-13 DIAGNOSIS — D68.51 FACTOR V LEIDEN (CMD): ICD-10-CM

## 2022-06-13 DIAGNOSIS — E03.9 HYPOTHYROIDISM, UNSPECIFIED TYPE: ICD-10-CM

## 2022-06-13 DIAGNOSIS — K21.9 GASTROESOPHAGEAL REFLUX DISEASE: ICD-10-CM

## 2022-06-13 DIAGNOSIS — R73.01 IFG (IMPAIRED FASTING GLUCOSE): ICD-10-CM

## 2022-06-13 DIAGNOSIS — Z00.00 ROUTINE GENERAL MEDICAL EXAMINATION AT A HEALTH CARE FACILITY: Primary | ICD-10-CM

## 2022-06-13 PROCEDURE — 99397 PER PM REEVAL EST PAT 65+ YR: CPT | Performed by: FAMILY MEDICINE

## 2022-06-13 RX ORDER — ATORVASTATIN CALCIUM 10 MG/1
10 TABLET, FILM COATED ORAL DAILY
Qty: 90 TABLET | Refills: 3 | Status: SHIPPED | OUTPATIENT
Start: 2022-06-13

## 2022-06-13 RX ORDER — OMEPRAZOLE 20 MG/1
20 CAPSULE, DELAYED RELEASE ORAL DAILY
Qty: 90 CAPSULE | Refills: 3 | Status: SHIPPED | OUTPATIENT
Start: 2022-06-13

## 2022-06-13 RX ORDER — LEVOTHYROXINE SODIUM 0.1 MG/1
100 TABLET ORAL DAILY
Qty: 90 TABLET | Refills: 3 | Status: SHIPPED | OUTPATIENT
Start: 2022-06-13

## 2022-06-13 RX ORDER — LOSARTAN POTASSIUM 25 MG/1
25 TABLET ORAL DAILY
Qty: 90 TABLET | Refills: 3 | Status: SHIPPED | OUTPATIENT
Start: 2022-06-13

## 2022-06-13 ASSESSMENT — PATIENT HEALTH QUESTIONNAIRE - PHQ9
SUM OF ALL RESPONSES TO PHQ9 QUESTIONS 1 AND 2: 0
2. FEELING DOWN, DEPRESSED OR HOPELESS: NOT AT ALL
CLINICAL INTERPRETATION OF PHQ2 SCORE: NO FURTHER SCREENING NEEDED
SUM OF ALL RESPONSES TO PHQ9 QUESTIONS 1 AND 2: 0
1. LITTLE INTEREST OR PLEASURE IN DOING THINGS: NOT AT ALL

## 2022-06-24 ENCOUNTER — APPOINTMENT (OUTPATIENT)
Dept: LAB | Age: 77
End: 2022-06-24

## 2022-07-01 ENCOUNTER — APPOINTMENT (OUTPATIENT)
Dept: FAMILY MEDICINE | Age: 77
End: 2022-07-01

## 2023-05-07 DIAGNOSIS — K21.9 GASTROESOPHAGEAL REFLUX DISEASE: ICD-10-CM

## 2023-05-07 DIAGNOSIS — Z86.73 HISTORY OF CVA (CEREBROVASCULAR ACCIDENT) WITHOUT RESIDUAL DEFICITS: ICD-10-CM

## 2023-05-08 RX ORDER — OMEPRAZOLE 20 MG/1
20 CAPSULE, DELAYED RELEASE ORAL DAILY
Qty: 90 CAPSULE | Refills: 3 | OUTPATIENT
Start: 2023-05-08

## 2023-05-08 RX ORDER — LOSARTAN POTASSIUM 25 MG/1
25 TABLET ORAL DAILY
Qty: 90 TABLET | Refills: 3 | OUTPATIENT
Start: 2023-05-08

## 2023-05-08 RX ORDER — ATORVASTATIN CALCIUM 10 MG/1
10 TABLET, FILM COATED ORAL DAILY
Qty: 90 TABLET | Refills: 3 | OUTPATIENT
Start: 2023-05-08
